# Patient Record
Sex: FEMALE | Race: WHITE | Employment: OTHER | URBAN - METROPOLITAN AREA
[De-identification: names, ages, dates, MRNs, and addresses within clinical notes are randomized per-mention and may not be internally consistent; named-entity substitution may affect disease eponyms.]

---

## 2017-01-01 ENCOUNTER — TELEPHONE (OUTPATIENT)
Dept: INTERNAL MEDICINE CLINIC | Age: 82
End: 2017-01-01

## 2017-01-01 ENCOUNTER — HOSPITAL ENCOUNTER (INPATIENT)
Age: 82
LOS: 6 days | Discharge: NURSING, REHAB OR LONG-TERM CARE | DRG: 682 | End: 2017-03-03
Attending: EMERGENCY MEDICINE | Admitting: INTERNAL MEDICINE
Payer: MEDICARE

## 2017-01-01 ENCOUNTER — APPOINTMENT (OUTPATIENT)
Dept: CT IMAGING | Age: 82
DRG: 682 | End: 2017-01-01
Attending: NURSE PRACTITIONER
Payer: MEDICARE

## 2017-01-01 ENCOUNTER — APPOINTMENT (OUTPATIENT)
Dept: MRI IMAGING | Age: 82
DRG: 682 | End: 2017-01-01
Attending: INTERNAL MEDICINE
Payer: MEDICARE

## 2017-01-01 ENCOUNTER — APPOINTMENT (OUTPATIENT)
Dept: GENERAL RADIOLOGY | Age: 82
DRG: 682 | End: 2017-01-01
Attending: NURSE PRACTITIONER
Payer: MEDICARE

## 2017-01-01 ENCOUNTER — PATIENT OUTREACH (OUTPATIENT)
Dept: INTERNAL MEDICINE CLINIC | Age: 82
End: 2017-01-01

## 2017-01-01 VITALS
BODY MASS INDEX: 21.66 KG/M2 | DIASTOLIC BLOOD PRESSURE: 81 MMHG | TEMPERATURE: 98.9 F | SYSTOLIC BLOOD PRESSURE: 162 MMHG | OXYGEN SATURATION: 94 % | RESPIRATION RATE: 16 BRPM | HEART RATE: 90 BPM | HEIGHT: 65 IN | WEIGHT: 130 LBS

## 2017-01-01 DIAGNOSIS — Z71.89 GOALS OF CARE, COUNSELING/DISCUSSION: ICD-10-CM

## 2017-01-01 DIAGNOSIS — E86.0 DEHYDRATION: ICD-10-CM

## 2017-01-01 DIAGNOSIS — E88.09 HYPOALBUMINEMIA: ICD-10-CM

## 2017-01-01 DIAGNOSIS — N17.9 AKI (ACUTE KIDNEY INJURY) (HCC): Primary | ICD-10-CM

## 2017-01-01 DIAGNOSIS — R41.0 CONFUSION: ICD-10-CM
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
## 2017-01-01 DIAGNOSIS — F01.50 VASCULAR DEMENTIA WITHOUT BEHAVIORAL DISTURBANCE (HCC): ICD-10-CM

## 2017-01-01 DIAGNOSIS — R53.81 DEBILITY: ICD-10-CM

## 2017-01-01 DIAGNOSIS — R05.9 COUGH: ICD-10-CM

## 2017-01-01 LAB
ALBUMIN SERPL BCP-MCNC: 2.3 G/DL (ref 3.5–5)
ALBUMIN SERPL BCP-MCNC: 3.2 G/DL (ref 3.5–5)
ALBUMIN/GLOB SERPL: 0.6 {RATIO} (ref 1.1–2.2)
ALBUMIN/GLOB SERPL: 0.7 {RATIO} (ref 1.1–2.2)
ALP SERPL-CCNC: 43 U/L (ref 45–117)
ALP SERPL-CCNC: 62 U/L (ref 45–117)
ALT SERPL-CCNC: 16 U/L (ref 12–78)
ALT SERPL-CCNC: 22 U/L (ref 12–78)
AMMONIA PLAS-SCNC: <10 UMOL/L
AMORPH CRY URNS QL MICRO: ABNORMAL
ANION GAP BLD CALC-SCNC: 10 MMOL/L (ref 5–15)
ANION GAP BLD CALC-SCNC: 14 MMOL/L (ref 5–15)
ANION GAP BLD CALC-SCNC: 17 MMOL/L (ref 5–15)
ANION GAP BLD CALC-SCNC: 19 MMOL/L (ref 5–15)
APPEARANCE UR: ABNORMAL
APTT PPP: 26.3 SEC (ref 22.1–32.5)
AST SERPL W P-5'-P-CCNC: 37 U/L (ref 15–37)
AST SERPL W P-5'-P-CCNC: 46 U/L (ref 15–37)
ATRIAL RATE: 112 BPM
BACTERIA SPEC CULT: NORMAL
BACTERIA URNS QL MICRO: NEGATIVE /HPF
BASOPHILS # BLD AUTO: 0 K/UL (ref 0–0.1)
BASOPHILS # BLD AUTO: 0 K/UL (ref 0–0.1)
BASOPHILS # BLD: 0 % (ref 0–1)
BASOPHILS # BLD: 0 % (ref 0–1)
BILIRUB DIRECT SERPL-MCNC: 0.3 MG/DL (ref 0–0.2)
BILIRUB SERPL-MCNC: 1 MG/DL (ref 0.2–1)
BILIRUB SERPL-MCNC: 1.7 MG/DL (ref 0.2–1)
BILIRUB UR QL CFM: POSITIVE
BUN SERPL-MCNC: 28 MG/DL (ref 6–20)
BUN SERPL-MCNC: 43 MG/DL (ref 6–20)
BUN SERPL-MCNC: 52 MG/DL (ref 6–20)
BUN SERPL-MCNC: 70 MG/DL (ref 6–20)
BUN/CREAT SERPL: 17 (ref 12–20)
BUN/CREAT SERPL: 26 (ref 12–20)
BUN/CREAT SERPL: 32 (ref 12–20)
BUN/CREAT SERPL: 35 (ref 12–20)
CALCIUM SERPL-MCNC: 7.9 MG/DL (ref 8.5–10.1)
CALCIUM SERPL-MCNC: 8.1 MG/DL (ref 8.5–10.1)
CALCIUM SERPL-MCNC: 8.4 MG/DL (ref 8.5–10.1)
CALCIUM SERPL-MCNC: 9.2 MG/DL (ref 8.5–10.1)
CALCULATED P AXIS, ECG09: -7 DEGREES
CALCULATED R AXIS, ECG10: 34 DEGREES
CALCULATED T AXIS, ECG11: 35 DEGREES
CHLORIDE SERPL-SCNC: 100 MMOL/L (ref 97–108)
CHLORIDE SERPL-SCNC: 109 MMOL/L (ref 97–108)
CHLORIDE SERPL-SCNC: 112 MMOL/L (ref 97–108)
CHLORIDE SERPL-SCNC: 112 MMOL/L (ref 97–108)
CK SERPL-CCNC: 333 U/L (ref 26–192)
CO2 SERPL-SCNC: 17 MMOL/L (ref 21–32)
CO2 SERPL-SCNC: 18 MMOL/L (ref 21–32)
CO2 SERPL-SCNC: 20 MMOL/L (ref 21–32)
CO2 SERPL-SCNC: 20 MMOL/L (ref 21–32)
COLOR UR: ABNORMAL
CREAT SERPL-MCNC: 1.36 MG/DL (ref 0.55–1.02)
CREAT SERPL-MCNC: 1.61 MG/DL (ref 0.55–1.02)
CREAT SERPL-MCNC: 1.63 MG/DL (ref 0.55–1.02)
CREAT SERPL-MCNC: 1.67 MG/DL (ref 0.55–1.02)
CREAT SERPL-MCNC: 2.01 MG/DL (ref 0.55–1.02)
DIAGNOSIS, 93000: NORMAL
DIFFERENTIAL METHOD BLD: ABNORMAL
EOSINOPHIL # BLD: 0 K/UL (ref 0–0.4)
EOSINOPHIL # BLD: 0 K/UL (ref 0–0.4)
EOSINOPHIL NFR BLD: 0 % (ref 0–7)
EOSINOPHIL NFR BLD: 0 % (ref 0–7)
EPITH CASTS URNS QL MICRO: ABNORMAL /LPF
ERYTHROCYTE [DISTWIDTH] IN BLOOD BY AUTOMATED COUNT: 15.9 % (ref 11.5–14.5)
ERYTHROCYTE [DISTWIDTH] IN BLOOD BY AUTOMATED COUNT: 16.2 % (ref 11.5–14.5)
FOLATE SERPL-MCNC: 21.2 NG/ML (ref 5–21)
GLOBULIN SER CALC-MCNC: 4 G/DL (ref 2–4)
GLOBULIN SER CALC-MCNC: 4.6 G/DL (ref 2–4)
GLUCOSE SERPL-MCNC: 158 MG/DL (ref 65–100)
GLUCOSE SERPL-MCNC: 177 MG/DL (ref 65–100)
GLUCOSE SERPL-MCNC: 191 MG/DL (ref 65–100)
GLUCOSE SERPL-MCNC: 220 MG/DL (ref 65–100)
GLUCOSE UR STRIP.AUTO-MCNC: NEGATIVE MG/DL
GRAN CASTS URNS QL MICRO: ABNORMAL /LPF
HCT VFR BLD AUTO: 38.3 % (ref 35–47)
HCT VFR BLD AUTO: 46 % (ref 35–47)
HGB BLD-MCNC: 12.7 G/DL (ref 11.5–16)
HGB BLD-MCNC: 15.3 G/DL (ref 11.5–16)
HGB UR QL STRIP: NEGATIVE
INR PPP: 1.1 (ref 0.9–1.1)
INR PPP: 1.2 (ref 0.9–1.1)
INR PPP: 1.4 (ref 0.9–1.1)
KETONES UR QL STRIP.AUTO: NEGATIVE MG/DL
LACTATE SERPL-SCNC: 0.9 MMOL/L (ref 0.4–2)
LACTATE SERPL-SCNC: 1 MMOL/L (ref 0.4–2)
LACTATE SERPL-SCNC: 3.1 MMOL/L (ref 0.4–2)
LEUKOCYTE ESTERASE UR QL STRIP.AUTO: NEGATIVE
LIPASE SERPL-CCNC: 182 U/L (ref 73–393)
LYMPHOCYTES # BLD AUTO: 4 % (ref 12–49)
LYMPHOCYTES # BLD AUTO: 7 % (ref 12–49)
LYMPHOCYTES # BLD: 0.8 K/UL (ref 0.8–3.5)
LYMPHOCYTES # BLD: 0.8 K/UL (ref 0.8–3.5)
MAGNESIUM SERPL-MCNC: 2.6 MG/DL (ref 1.6–2.4)
MCH RBC QN AUTO: 30.7 PG (ref 26–34)
MCH RBC QN AUTO: 31 PG (ref 26–34)
MCHC RBC AUTO-ENTMCNC: 33.2 G/DL (ref 30–36.5)
MCHC RBC AUTO-ENTMCNC: 33.3 G/DL (ref 30–36.5)
MCV RBC AUTO: 92.4 FL (ref 80–99)
MCV RBC AUTO: 93.4 FL (ref 80–99)
MONOCYTES # BLD: 0.6 K/UL (ref 0–1)
MONOCYTES # BLD: 1.2 K/UL (ref 0–1)
MONOCYTES NFR BLD AUTO: 6 % (ref 5–13)
MONOCYTES NFR BLD AUTO: 6 % (ref 5–13)
NEUTS SEG # BLD: 17.9 K/UL (ref 1.8–8)
NEUTS SEG # BLD: 9.4 K/UL (ref 1.8–8)
NEUTS SEG NFR BLD AUTO: 87 % (ref 32–75)
NEUTS SEG NFR BLD AUTO: 90 % (ref 32–75)
NITRITE UR QL STRIP.AUTO: NEGATIVE
NRBC # BLD: 0 K/UL (ref 0–0.01)
NRBC BLD-RTO: 0 PER 100 WBC
P-R INTERVAL, ECG05: 126 MS
PH UR STRIP: 5 [PH] (ref 5–8)
PLATELET # BLD AUTO: 144 K/UL (ref 150–400)
PLATELET # BLD AUTO: 198 K/UL (ref 150–400)
POTASSIUM SERPL-SCNC: 3.7 MMOL/L (ref 3.5–5.1)
POTASSIUM SERPL-SCNC: 4 MMOL/L (ref 3.5–5.1)
POTASSIUM SERPL-SCNC: 4.4 MMOL/L (ref 3.5–5.1)
POTASSIUM SERPL-SCNC: 4.8 MMOL/L (ref 3.5–5.1)
PROT SERPL-MCNC: 6.3 G/DL (ref 6.4–8.2)
PROT SERPL-MCNC: 7.8 G/DL (ref 6.4–8.2)
PROT UR STRIP-MCNC: 30 MG/DL
PROTHROMBIN TIME: 10.9 SEC (ref 9–11.1)
PROTHROMBIN TIME: 11.8 SEC (ref 9–11.1)
PROTHROMBIN TIME: 14.5 SEC (ref 9–11.1)
Q-T INTERVAL, ECG07: 340 MS
QRS DURATION, ECG06: 70 MS
QTC CALCULATION (BEZET), ECG08: 464 MS
RBC # BLD AUTO: 4.1 M/UL (ref 3.8–5.2)
RBC # BLD AUTO: 4.98 M/UL (ref 3.8–5.2)
RBC #/AREA URNS HPF: ABNORMAL /HPF (ref 0–5)
RBC MORPH BLD: ABNORMAL
RBC MORPH BLD: ABNORMAL
SERVICE CMNT-IMP: NORMAL
SODIUM SERPL-SCNC: 139 MMOL/L (ref 136–145)
SODIUM SERPL-SCNC: 142 MMOL/L (ref 136–145)
SODIUM SERPL-SCNC: 143 MMOL/L (ref 136–145)
SODIUM SERPL-SCNC: 144 MMOL/L (ref 136–145)
SP GR UR REFRACTOMETRY: 1.02 (ref 1–1.03)
THERAPEUTIC RANGE,PTTT: NORMAL SECS (ref 58–77)
TROPONIN I SERPL-MCNC: 0.08 NG/ML
TROPONIN I SERPL-MCNC: 0.08 NG/ML
TROPONIN I SERPL-MCNC: 0.12 NG/ML
TSH SERPL DL<=0.05 MIU/L-ACNC: 0.49 UIU/ML (ref 0.36–3.74)
UROBILINOGEN UR QL STRIP.AUTO: 0.2 EU/DL (ref 0.2–1)
VENTRICULAR RATE, ECG03: 112 BPM
VIT B12 SERPL-MCNC: >2000 PG/ML (ref 211–911)
WBC # BLD AUTO: 10.8 K/UL (ref 3.6–11)
WBC # BLD AUTO: 19.9 K/UL (ref 3.6–11)
WBC URNS QL MICRO: ABNORMAL /HPF (ref 0–4)

## 2017-01-01 PROCEDURE — 83605 ASSAY OF LACTIC ACID: CPT | Performed by: NURSE PRACTITIONER

## 2017-01-01 PROCEDURE — 74011000258 HC RX REV CODE- 258: Performed by: INTERNAL MEDICINE

## 2017-01-01 PROCEDURE — 77010033678 HC OXYGEN DAILY

## 2017-01-01 PROCEDURE — 36415 COLL VENOUS BLD VENIPUNCTURE: CPT | Performed by: INTERNAL MEDICINE

## 2017-01-01 PROCEDURE — 76450000000

## 2017-01-01 PROCEDURE — 65660000000 HC RM CCU STEPDOWN

## 2017-01-01 PROCEDURE — 85610 PROTHROMBIN TIME: CPT | Performed by: INTERNAL MEDICINE

## 2017-01-01 PROCEDURE — 97530 THERAPEUTIC ACTIVITIES: CPT

## 2017-01-01 PROCEDURE — 97110 THERAPEUTIC EXERCISES: CPT

## 2017-01-01 PROCEDURE — 74011250636 HC RX REV CODE- 250/636: Performed by: INTERNAL MEDICINE

## 2017-01-01 PROCEDURE — 77030011943

## 2017-01-01 PROCEDURE — 81001 URINALYSIS AUTO W/SCOPE: CPT | Performed by: NURSE PRACTITIONER

## 2017-01-01 PROCEDURE — 74011636637 HC RX REV CODE- 636/637: Performed by: INTERNAL MEDICINE

## 2017-01-01 PROCEDURE — 80048 BASIC METABOLIC PNL TOTAL CA: CPT | Performed by: INTERNAL MEDICINE

## 2017-01-01 PROCEDURE — 82140 ASSAY OF AMMONIA: CPT | Performed by: NURSE PRACTITIONER

## 2017-01-01 PROCEDURE — 97116 GAIT TRAINING THERAPY: CPT

## 2017-01-01 PROCEDURE — 72170 X-RAY EXAM OF PELVIS: CPT

## 2017-01-01 PROCEDURE — 84484 ASSAY OF TROPONIN QUANT: CPT | Performed by: INTERNAL MEDICINE

## 2017-01-01 PROCEDURE — 82565 ASSAY OF CREATININE: CPT | Performed by: INTERNAL MEDICINE

## 2017-01-01 PROCEDURE — 97535 SELF CARE MNGMENT TRAINING: CPT | Performed by: OCCUPATIONAL THERAPIST

## 2017-01-01 PROCEDURE — 74011250637 HC RX REV CODE- 250/637: Performed by: INTERNAL MEDICINE

## 2017-01-01 PROCEDURE — 72100 X-RAY EXAM L-S SPINE 2/3 VWS: CPT

## 2017-01-01 PROCEDURE — 74176 CT ABD & PELVIS W/O CONTRAST: CPT

## 2017-01-01 PROCEDURE — 70450 CT HEAD/BRAIN W/O DYE: CPT

## 2017-01-01 PROCEDURE — 93005 ELECTROCARDIOGRAM TRACING: CPT

## 2017-01-01 PROCEDURE — 82607 VITAMIN B-12: CPT | Performed by: INTERNAL MEDICINE

## 2017-01-01 PROCEDURE — 70551 MRI BRAIN STEM W/O DYE: CPT

## 2017-01-01 PROCEDURE — 74011250636 HC RX REV CODE- 250/636: Performed by: NURSE PRACTITIONER

## 2017-01-01 PROCEDURE — 83605 ASSAY OF LACTIC ACID: CPT | Performed by: INTERNAL MEDICINE

## 2017-01-01 PROCEDURE — 97535 SELF CARE MNGMENT TRAINING: CPT

## 2017-01-01 PROCEDURE — 80076 HEPATIC FUNCTION PANEL: CPT | Performed by: INTERNAL MEDICINE

## 2017-01-01 PROCEDURE — 84484 ASSAY OF TROPONIN QUANT: CPT | Performed by: NURSE PRACTITIONER

## 2017-01-01 PROCEDURE — 51701 INSERT BLADDER CATHETER: CPT

## 2017-01-01 PROCEDURE — 72125 CT NECK SPINE W/O DYE: CPT

## 2017-01-01 PROCEDURE — 82746 ASSAY OF FOLIC ACID SERUM: CPT | Performed by: INTERNAL MEDICINE

## 2017-01-01 PROCEDURE — 84443 ASSAY THYROID STIM HORMONE: CPT | Performed by: INTERNAL MEDICINE

## 2017-01-01 PROCEDURE — 87040 BLOOD CULTURE FOR BACTERIA: CPT | Performed by: NURSE PRACTITIONER

## 2017-01-01 PROCEDURE — 85610 PROTHROMBIN TIME: CPT | Performed by: NURSE PRACTITIONER

## 2017-01-01 PROCEDURE — 85025 COMPLETE CBC W/AUTO DIFF WBC: CPT | Performed by: NURSE PRACTITIONER

## 2017-01-01 PROCEDURE — 71020 XR CHEST PA LAT: CPT

## 2017-01-01 PROCEDURE — 93306 TTE W/DOPPLER COMPLETE: CPT

## 2017-01-01 PROCEDURE — 99285 EMERGENCY DEPT VISIT HI MDM: CPT

## 2017-01-01 PROCEDURE — 83735 ASSAY OF MAGNESIUM: CPT | Performed by: NURSE PRACTITIONER

## 2017-01-01 PROCEDURE — 85730 THROMBOPLASTIN TIME PARTIAL: CPT | Performed by: NURSE PRACTITIONER

## 2017-01-01 PROCEDURE — 83690 ASSAY OF LIPASE: CPT | Performed by: NURSE PRACTITIONER

## 2017-01-01 PROCEDURE — 97161 PT EVAL LOW COMPLEX 20 MIN: CPT

## 2017-01-01 PROCEDURE — 82550 ASSAY OF CK (CPK): CPT | Performed by: NURSE PRACTITIONER

## 2017-01-01 PROCEDURE — 97165 OT EVAL LOW COMPLEX 30 MIN: CPT

## 2017-01-01 PROCEDURE — 72070 X-RAY EXAM THORAC SPINE 2VWS: CPT

## 2017-01-01 PROCEDURE — 77030032490 HC SLV COMPR SCD KNE COVD -B

## 2017-01-01 PROCEDURE — 36415 COLL VENOUS BLD VENIPUNCTURE: CPT | Performed by: NURSE PRACTITIONER

## 2017-01-01 PROCEDURE — 80053 COMPREHEN METABOLIC PANEL: CPT | Performed by: NURSE PRACTITIONER

## 2017-01-01 PROCEDURE — 85025 COMPLETE CBC W/AUTO DIFF WBC: CPT | Performed by: INTERNAL MEDICINE

## 2017-01-01 RX ORDER — DONEPEZIL HYDROCHLORIDE 5 MG/1
5 TABLET, FILM COATED ORAL
Status: DISCONTINUED | OUTPATIENT
Start: 2017-01-01 | End: 2017-01-01 | Stop reason: HOSPADM

## 2017-01-01 RX ORDER — FERROUS GLUCONATE 324(38)MG
1 TABLET ORAL
Status: DISCONTINUED | OUTPATIENT
Start: 2017-01-01 | End: 2017-01-01 | Stop reason: HOSPADM

## 2017-01-01 RX ORDER — MEMANTINE HYDROCHLORIDE 10 MG/1
5 TABLET ORAL 2 TIMES DAILY
Qty: 180 TAB | Refills: 1 | Status: SHIPPED | OUTPATIENT
Start: 2017-01-01

## 2017-01-01 RX ORDER — MELATONIN
1000 DAILY
Status: DISCONTINUED | OUTPATIENT
Start: 2017-01-01 | End: 2017-01-01 | Stop reason: HOSPADM

## 2017-01-01 RX ORDER — WARFARIN SODIUM 5 MG/1
5 TABLET ORAL ONCE
Status: COMPLETED | OUTPATIENT
Start: 2017-01-01 | End: 2017-01-01

## 2017-01-01 RX ORDER — MEMANTINE HYDROCHLORIDE 10 MG/1
5 TABLET ORAL 2 TIMES DAILY
Status: DISCONTINUED | OUTPATIENT
Start: 2017-01-01 | End: 2017-01-01 | Stop reason: HOSPADM

## 2017-01-01 RX ORDER — GUAIFENESIN/DEXTROMETHORPHAN 100-10MG/5
5 SYRUP ORAL
Status: DISCONTINUED | OUTPATIENT
Start: 2017-01-01 | End: 2017-01-01 | Stop reason: HOSPADM

## 2017-01-01 RX ORDER — MEMANTINE HYDROCHLORIDE 10 MG/1
10 TABLET ORAL 2 TIMES DAILY
Status: DISCONTINUED | OUTPATIENT
Start: 2017-01-01 | End: 2017-01-01

## 2017-01-01 RX ORDER — SODIUM CHLORIDE 9 MG/ML
100 INJECTION, SOLUTION INTRAVENOUS CONTINUOUS
Status: DISCONTINUED | OUTPATIENT
Start: 2017-01-01 | End: 2017-01-01

## 2017-01-01 RX ORDER — ACETAMINOPHEN 325 MG/1
650 TABLET ORAL
Status: DISCONTINUED | OUTPATIENT
Start: 2017-01-01 | End: 2017-01-01 | Stop reason: HOSPADM

## 2017-01-01 RX ORDER — LANOLIN ALCOHOL/MO/W.PET/CERES
1000 CREAM (GRAM) TOPICAL DAILY
COMMUNITY

## 2017-01-01 RX ORDER — SODIUM CHLORIDE 0.9 % (FLUSH) 0.9 %
5-10 SYRINGE (ML) INJECTION AS NEEDED
Status: DISCONTINUED | OUTPATIENT
Start: 2017-01-01 | End: 2017-01-01 | Stop reason: HOSPADM

## 2017-01-01 RX ORDER — LOPERAMIDE HYDROCHLORIDE 2 MG/1
2 CAPSULE ORAL
Status: DISCONTINUED | OUTPATIENT
Start: 2017-01-01 | End: 2017-01-01 | Stop reason: HOSPADM

## 2017-01-01 RX ORDER — PREDNISONE 5 MG/1
10 TABLET ORAL EVERY EVENING
Status: DISCONTINUED | OUTPATIENT
Start: 2017-01-01 | End: 2017-01-01 | Stop reason: HOSPADM

## 2017-01-01 RX ORDER — SODIUM CHLORIDE 0.9 % (FLUSH) 0.9 %
5-10 SYRINGE (ML) INJECTION EVERY 8 HOURS
Status: DISCONTINUED | OUTPATIENT
Start: 2017-01-01 | End: 2017-01-01 | Stop reason: HOSPADM

## 2017-01-01 RX ORDER — ONDANSETRON 2 MG/ML
4 INJECTION INTRAMUSCULAR; INTRAVENOUS
Status: DISCONTINUED | OUTPATIENT
Start: 2017-01-01 | End: 2017-01-01 | Stop reason: HOSPADM

## 2017-01-01 RX ORDER — FERROUS SULFATE, DRIED 160(50) MG
1 TABLET, EXTENDED RELEASE ORAL 2 TIMES DAILY WITH MEALS
Status: DISCONTINUED | OUTPATIENT
Start: 2017-01-01 | End: 2017-01-01 | Stop reason: HOSPADM

## 2017-01-01 RX ORDER — HYDRALAZINE HYDROCHLORIDE 20 MG/ML
10 INJECTION INTRAMUSCULAR; INTRAVENOUS
Status: DISCONTINUED | OUTPATIENT
Start: 2017-01-01 | End: 2017-01-01 | Stop reason: HOSPADM

## 2017-01-01 RX ORDER — LANOLIN ALCOHOL/MO/W.PET/CERES
1000 CREAM (GRAM) TOPICAL DAILY
Status: DISCONTINUED | OUTPATIENT
Start: 2017-01-01 | End: 2017-01-01

## 2017-01-01 RX ORDER — AMOXICILLIN AND CLAVULANATE POTASSIUM 500; 125 MG/1; MG/1
1 TABLET, FILM COATED ORAL EVERY 12 HOURS
Status: DISCONTINUED | OUTPATIENT
Start: 2017-01-01 | End: 2017-01-01 | Stop reason: HOSPADM

## 2017-01-01 RX ORDER — AMOXICILLIN AND CLAVULANATE POTASSIUM 500; 125 MG/1; MG/1
1 TABLET, FILM COATED ORAL EVERY 12 HOURS
Qty: 10 TAB | Refills: 0 | Status: SHIPPED | OUTPATIENT
Start: 2017-01-01

## 2017-01-01 RX ORDER — FERROUS GLUCONATE 324(38)MG
TABLET ORAL
Qty: 30 TAB | Refills: 2 | OUTPATIENT
Start: 2017-01-01

## 2017-01-01 RX ORDER — PANTOPRAZOLE SODIUM 40 MG/1
40 TABLET, DELAYED RELEASE ORAL DAILY
Status: DISCONTINUED | OUTPATIENT
Start: 2017-01-01 | End: 2017-01-01 | Stop reason: HOSPADM

## 2017-01-01 RX ORDER — WARFARIN SODIUM 5 MG/1
5 TABLET ORAL DAILY
Qty: 30 TAB | Refills: 0 | Status: SHIPPED
Start: 2017-01-01

## 2017-01-01 RX ADMIN — Medication 10 ML: at 07:42

## 2017-01-01 RX ADMIN — PIPERACILLIN SODIUM,TAZOBACTAM SODIUM 3.38 G: 3; .375 INJECTION, POWDER, FOR SOLUTION INTRAVENOUS at 06:43

## 2017-01-01 RX ADMIN — HYDRALAZINE HYDROCHLORIDE 10 MG: 20 INJECTION INTRAMUSCULAR; INTRAVENOUS at 02:51

## 2017-01-01 RX ADMIN — VITAMIN D, TAB 1000IU (100/BT) 1000 UNITS: 25 TAB at 08:31

## 2017-01-01 RX ADMIN — PIPERACILLIN SODIUM,TAZOBACTAM SODIUM 3.38 G: 3; .375 INJECTION, POWDER, FOR SOLUTION INTRAVENOUS at 23:10

## 2017-01-01 RX ADMIN — FERROUS GLUCONATE 1 TABLET: 324 TABLET ORAL at 09:46

## 2017-01-01 RX ADMIN — Medication 10 ML: at 21:37

## 2017-01-01 RX ADMIN — FERROUS GLUCONATE 1 TABLET: 324 TABLET ORAL at 09:28

## 2017-01-01 RX ADMIN — CALCIUM CARBONATE 500 MG (1,250 MG)-VITAMIN D3 200 UNIT TABLET 1 TABLET: at 09:01

## 2017-01-01 RX ADMIN — MEMANTINE HYDROCHLORIDE 10 MG: 10 TABLET, FILM COATED ORAL at 18:17

## 2017-01-01 RX ADMIN — MEMANTINE HYDROCHLORIDE 10 MG: 10 TABLET, FILM COATED ORAL at 17:55

## 2017-01-01 RX ADMIN — MEMANTINE HYDROCHLORIDE 5 MG: 10 TABLET ORAL at 21:21

## 2017-01-01 RX ADMIN — CALCIUM CARBONATE 500 MG (1,250 MG)-VITAMIN D3 200 UNIT TABLET 1 TABLET: at 16:25

## 2017-01-01 RX ADMIN — PANTOPRAZOLE SODIUM 40 MG: 40 TABLET, DELAYED RELEASE ORAL at 06:24

## 2017-01-01 RX ADMIN — SODIUM CHLORIDE 100 ML/HR: 900 INJECTION, SOLUTION INTRAVENOUS at 13:51

## 2017-01-01 RX ADMIN — CALCIUM CARBONATE 500 MG (1,250 MG)-VITAMIN D3 200 UNIT TABLET 1 TABLET: at 17:01

## 2017-01-01 RX ADMIN — Medication 1000 MCG: at 09:45

## 2017-01-01 RX ADMIN — FERROUS GLUCONATE 1 TABLET: 324 TABLET ORAL at 09:00

## 2017-01-01 RX ADMIN — PREDNISONE 10 MG: 5 TABLET ORAL at 17:55

## 2017-01-01 RX ADMIN — MEMANTINE HYDROCHLORIDE 10 MG: 10 TABLET, FILM COATED ORAL at 09:28

## 2017-01-01 RX ADMIN — Medication 10 ML: at 22:00

## 2017-01-01 RX ADMIN — LOPERAMIDE HYDROCHLORIDE 2 MG: 2 CAPSULE ORAL at 19:10

## 2017-01-01 RX ADMIN — RIVAROXABAN 20 MG: 20 TABLET, FILM COATED ORAL at 16:25

## 2017-01-01 RX ADMIN — CALCIUM CARBONATE 500 MG (1,250 MG)-VITAMIN D3 200 UNIT TABLET 1 TABLET: at 18:17

## 2017-01-01 RX ADMIN — PIPERACILLIN SODIUM,TAZOBACTAM SODIUM 3.38 G: 3; .375 INJECTION, POWDER, FOR SOLUTION INTRAVENOUS at 06:42

## 2017-01-01 RX ADMIN — DONEPEZIL HYDROCHLORIDE 5 MG: 5 TABLET, FILM COATED ORAL at 22:48

## 2017-01-01 RX ADMIN — VITAMIN D, TAB 1000IU (100/BT) 1000 UNITS: 25 TAB at 09:00

## 2017-01-01 RX ADMIN — MEMANTINE HYDROCHLORIDE 10 MG: 10 TABLET, FILM COATED ORAL at 17:31

## 2017-01-01 RX ADMIN — GUAIFENESIN AND DEXTROMETHORPHAN 5 ML: 100; 10 SYRUP ORAL at 06:34

## 2017-01-01 RX ADMIN — PIPERACILLIN SODIUM,TAZOBACTAM SODIUM 3.38 G: 3; .375 INJECTION, POWDER, FOR SOLUTION INTRAVENOUS at 14:51

## 2017-01-01 RX ADMIN — Medication 10 ML: at 21:21

## 2017-01-01 RX ADMIN — Medication 10 ML: at 06:42

## 2017-01-01 RX ADMIN — RIVAROXABAN 20 MG: 20 TABLET, FILM COATED ORAL at 18:17

## 2017-01-01 RX ADMIN — ACETAMINOPHEN 650 MG: 325 TABLET ORAL at 17:16

## 2017-01-01 RX ADMIN — FERROUS GLUCONATE 1 TABLET: 324 TABLET ORAL at 08:31

## 2017-01-01 RX ADMIN — AMOXICILLIN AND CLAVULANATE POTASSIUM 1 TABLET: 500; 125 TABLET, FILM COATED ORAL at 22:07

## 2017-01-01 RX ADMIN — WARFARIN SODIUM 5 MG: 5 TABLET ORAL at 17:16

## 2017-01-01 RX ADMIN — CALCIUM CARBONATE 500 MG (1,250 MG)-VITAMIN D3 200 UNIT TABLET 1 TABLET: at 08:31

## 2017-01-01 RX ADMIN — DONEPEZIL HYDROCHLORIDE 5 MG: 5 TABLET, FILM COATED ORAL at 21:36

## 2017-01-01 RX ADMIN — PREDNISONE 10 MG: 5 TABLET ORAL at 17:31

## 2017-01-01 RX ADMIN — GUAIFENESIN AND DEXTROMETHORPHAN 5 ML: 100; 10 SYRUP ORAL at 21:42

## 2017-01-01 RX ADMIN — Medication 10 ML: at 08:09

## 2017-01-01 RX ADMIN — Medication 10 ML: at 15:20

## 2017-01-01 RX ADMIN — DONEPEZIL HYDROCHLORIDE 5 MG: 5 TABLET, FILM COATED ORAL at 22:08

## 2017-01-01 RX ADMIN — PREDNISONE 10 MG: 5 TABLET ORAL at 17:16

## 2017-01-01 RX ADMIN — RIVAROXABAN 20 MG: 20 TABLET, FILM COATED ORAL at 17:31

## 2017-01-01 RX ADMIN — CALCIUM CARBONATE 500 MG (1,250 MG)-VITAMIN D3 200 UNIT TABLET 1 TABLET: at 17:16

## 2017-01-01 RX ADMIN — MEMANTINE HYDROCHLORIDE 5 MG: 10 TABLET ORAL at 09:28

## 2017-01-01 RX ADMIN — VITAMIN D, TAB 1000IU (100/BT) 1000 UNITS: 25 TAB at 09:28

## 2017-01-01 RX ADMIN — PREDNISONE 10 MG: 5 TABLET ORAL at 22:56

## 2017-01-01 RX ADMIN — SODIUM CHLORIDE 1000 ML: 900 INJECTION, SOLUTION INTRAVENOUS at 19:38

## 2017-01-01 RX ADMIN — WARFARIN SODIUM 5 MG: 5 TABLET ORAL at 17:21

## 2017-01-01 RX ADMIN — MEMANTINE HYDROCHLORIDE 10 MG: 10 TABLET, FILM COATED ORAL at 09:00

## 2017-01-01 RX ADMIN — CALCIUM CARBONATE 500 MG (1,250 MG)-VITAMIN D3 200 UNIT TABLET 1 TABLET: at 09:28

## 2017-01-01 RX ADMIN — PREDNISONE 10 MG: 5 TABLET ORAL at 17:21

## 2017-01-01 RX ADMIN — Medication 10 ML: at 17:31

## 2017-01-01 RX ADMIN — MEMANTINE HYDROCHLORIDE 10 MG: 10 TABLET, FILM COATED ORAL at 08:31

## 2017-01-01 RX ADMIN — MEMANTINE HYDROCHLORIDE 5 MG: 10 TABLET ORAL at 09:01

## 2017-01-01 RX ADMIN — CALCIUM CARBONATE 500 MG (1,250 MG)-VITAMIN D3 200 UNIT TABLET 1 TABLET: at 09:44

## 2017-01-01 RX ADMIN — MEMANTINE HYDROCHLORIDE 5 MG: 10 TABLET ORAL at 22:07

## 2017-01-01 RX ADMIN — PIPERACILLIN SODIUM,TAZOBACTAM SODIUM 3.38 G: 3; .375 INJECTION, POWDER, FOR SOLUTION INTRAVENOUS at 22:39

## 2017-01-01 RX ADMIN — PIPERACILLIN SODIUM,TAZOBACTAM SODIUM 3.38 G: 3; .375 INJECTION, POWDER, FOR SOLUTION INTRAVENOUS at 08:20

## 2017-01-01 RX ADMIN — PREDNISONE 10 MG: 5 TABLET ORAL at 18:17

## 2017-01-01 RX ADMIN — PANTOPRAZOLE SODIUM 40 MG: 40 TABLET, DELAYED RELEASE ORAL at 08:31

## 2017-01-01 RX ADMIN — PIPERACILLIN SODIUM,TAZOBACTAM SODIUM 3.38 G: 3; .375 INJECTION, POWDER, FOR SOLUTION INTRAVENOUS at 22:49

## 2017-01-01 RX ADMIN — Medication 10 ML: at 06:24

## 2017-01-01 RX ADMIN — CALCIUM CARBONATE 500 MG (1,250 MG)-VITAMIN D3 200 UNIT TABLET 1 TABLET: at 09:00

## 2017-01-01 RX ADMIN — CALCIUM CARBONATE 500 MG (1,250 MG)-VITAMIN D3 200 UNIT TABLET 1 TABLET: at 17:31

## 2017-01-01 RX ADMIN — VITAMIN D, TAB 1000IU (100/BT) 1000 UNITS: 25 TAB at 09:02

## 2017-01-01 RX ADMIN — AMOXICILLIN AND CLAVULANATE POTASSIUM 1 TABLET: 500; 125 TABLET, FILM COATED ORAL at 09:01

## 2017-01-01 RX ADMIN — PREDNISONE 10 MG: 5 TABLET ORAL at 17:01

## 2017-01-01 RX ADMIN — PANTOPRAZOLE SODIUM 40 MG: 40 TABLET, DELAYED RELEASE ORAL at 09:28

## 2017-01-01 RX ADMIN — PANTOPRAZOLE SODIUM 40 MG: 40 TABLET, DELAYED RELEASE ORAL at 07:42

## 2017-01-01 RX ADMIN — PIPERACILLIN SODIUM,TAZOBACTAM SODIUM 3.38 G: 3; .375 INJECTION, POWDER, FOR SOLUTION INTRAVENOUS at 15:38

## 2017-01-01 RX ADMIN — AMOXICILLIN AND CLAVULANATE POTASSIUM 1 TABLET: 500; 125 TABLET, FILM COATED ORAL at 21:21

## 2017-01-01 RX ADMIN — MEMANTINE HYDROCHLORIDE 10 MG: 10 TABLET, FILM COATED ORAL at 09:46

## 2017-01-01 RX ADMIN — PANTOPRAZOLE SODIUM 40 MG: 40 TABLET, DELAYED RELEASE ORAL at 08:08

## 2017-01-01 RX ADMIN — DONEPEZIL HYDROCHLORIDE 5 MG: 5 TABLET, FILM COATED ORAL at 22:56

## 2017-01-01 RX ADMIN — PIPERACILLIN SODIUM,TAZOBACTAM SODIUM 3.38 G: 3; .375 INJECTION, POWDER, FOR SOLUTION INTRAVENOUS at 14:57

## 2017-01-01 RX ADMIN — FERROUS GLUCONATE 1 TABLET: 324 TABLET ORAL at 09:01

## 2017-01-01 RX ADMIN — SODIUM CHLORIDE 100 ML/HR: 900 INJECTION, SOLUTION INTRAVENOUS at 02:27

## 2017-01-01 RX ADMIN — Medication 10 ML: at 14:32

## 2017-01-01 RX ADMIN — Medication 10 ML: at 21:38

## 2017-01-01 RX ADMIN — SODIUM CHLORIDE 75 ML/HR: 900 INJECTION, SOLUTION INTRAVENOUS at 15:32

## 2017-01-01 RX ADMIN — CALCIUM CARBONATE 500 MG (1,250 MG)-VITAMIN D3 200 UNIT TABLET 1 TABLET: at 16:43

## 2017-01-01 RX ADMIN — PIPERACILLIN SODIUM,TAZOBACTAM SODIUM 3.38 G: 3; .375 INJECTION, POWDER, FOR SOLUTION INTRAVENOUS at 16:24

## 2017-01-01 RX ADMIN — LOPERAMIDE HYDROCHLORIDE 2 MG: 2 CAPSULE ORAL at 09:02

## 2017-01-01 RX ADMIN — HYDRALAZINE HYDROCHLORIDE 10 MG: 20 INJECTION INTRAMUSCULAR; INTRAVENOUS at 18:39

## 2017-01-01 RX ADMIN — WARFARIN SODIUM 5 MG: 5 TABLET ORAL at 17:01

## 2017-01-01 RX ADMIN — Medication 10 ML: at 13:43

## 2017-01-01 RX ADMIN — Medication 10 ML: at 05:29

## 2017-01-01 RX ADMIN — SODIUM CHLORIDE 2000 ML: 900 INJECTION, SOLUTION INTRAVENOUS at 19:38

## 2017-01-01 RX ADMIN — PANTOPRAZOLE SODIUM 40 MG: 40 TABLET, DELAYED RELEASE ORAL at 06:43

## 2017-01-01 RX ADMIN — Medication 10 ML: at 22:50

## 2017-01-01 RX ADMIN — AMOXICILLIN AND CLAVULANATE POTASSIUM 1 TABLET: 500; 125 TABLET, FILM COATED ORAL at 09:28

## 2017-01-01 RX ADMIN — VITAMIN D, TAB 1000IU (100/BT) 1000 UNITS: 25 TAB at 09:45

## 2017-01-01 RX ADMIN — DONEPEZIL HYDROCHLORIDE 5 MG: 5 TABLET, FILM COATED ORAL at 21:38

## 2017-01-01 RX ADMIN — DONEPEZIL HYDROCHLORIDE 5 MG: 5 TABLET, FILM COATED ORAL at 21:21

## 2017-02-25 PROBLEM — E86.0 DEHYDRATION: Status: ACTIVE | Noted: 2017-01-01

## 2017-02-25 PROBLEM — E87.20 LACTIC ACIDOSIS: Status: ACTIVE | Noted: 2017-01-01

## 2017-02-25 PROBLEM — G93.41 ACUTE METABOLIC ENCEPHALOPATHY: Status: ACTIVE | Noted: 2017-01-01

## 2017-02-25 NOTE — IP AVS SNAPSHOT
Richmond Cain 
 
 
 [address] [place] [address] 
[phone] Patient: Eduard Restrepo MRN: [id number] MHT:51/81/9188 You are allergic to the following No active allergies Recent Documentation Height  
  
  
  
  
  
 1.651 m Emergency Contacts Name Discharge Info Relation Home Work Mobile Alex Brody DISCHARGE CAREGIVER [3] Child [2] [phone] About your hospitalization You were admitted on:  February 25, 2017 [facility] in the:  [facility]  MED SURG 2 You were discharged on:  March 3, 2017 Why you were hospitalized Your primary diagnosis was:  Not on File Your diagnoses also included:  Acute Metabolic Encephalopathy, Autoimmune Hepatitis (Hcc), Ckd (Chronic Kidney Disease) Stage 3, Gfr 30-59 Ml/Min, Cirrhosis Of Liver (Hcc), Dementia, Hx Pulmonary Embolism, Sirs (Systemic Inflammatory Response Syndrome) (Hcc), Dehydration, Lactic Acidosis Providers Seen During Your Hospitalizations Provider Role Specialty Primary office phone Gaston Anderson MD Attending Provider Emergency Medicine [phone] Steve Mason DO Attending Provider Internal Medicine [phone] Josee Estevez MD Attending Provider Internal Medicine [phone] Davidson Hope MD Attending Provider Hospitalist [phone] Your Primary Care Physician (PCP) Primary Care Physician Office Phone Office Fax [place], [address] [phone] Follow-up Information Follow up With Details Comments Contact Info Shailesh Schuler MD   [address] [facility] [address] 
[phone] Current Discharge Medication List  
  
START taking these medications Dose & Instructions Dispensing Information Comments Morning Noon Evening Bedtime amoxicillin-clavulanate 500-125 mg per tablet Commonly known as:  AUGMENTIN Your next dose is: Today, Tomorrow Other:  _________ Dose:  1 Tab Take 1 Tab by mouth every twelve (12) hours. Quantity:  10 Tab Refills:  0  
     
   
   
   
  
 warfarin 5 mg tablet Commonly known as:  COUMADIN Your next dose is: Today, Tomorrow Other:  _________ Dose:  5 mg Take 1 Tab by mouth daily. Quantity:  30 Tab Refills:  0 CONTINUE these medications which have CHANGED Dose & Instructions Dispensing Information Comments Morning Noon Evening Bedtime  
 memantine 10 mg tablet Commonly known as:  Adryan Net What changed:  how much to take Your next dose is: Today, Tomorrow Other:  _________ Dose:  5 mg Take 0.5 Tabs by mouth two (2) times a day. Indications: Per son Dr. Radha Martinez stated pt should be taking Quantity:  180 Tab Refills:  1 CONTINUE these medications which have NOT CHANGED Dose & Instructions Dispensing Information Comments Morning Noon Evening Bedtime  
 calcium-vitamin D 500 mg(1,250mg) -200 unit per tablet Commonly known as:  OYSTER SHELL Your next dose is: Today, Tomorrow Other:  _________ Dose:  1 Tab Take 1 Tab by mouth two (2) times daily (with meals). Refills:  0  
     
   
   
   
  
 cholecalciferol 1,000 unit tablet Commonly known as:  VITAMIN D3 Your next dose is: Today, Tomorrow Other:  _________ Dose:  1000 Units Take 1,000 Units by mouth daily. Refills:  0  
     
   
   
   
  
 denosumab 60 mg/mL injection Commonly known as:  Pricila Rasp Your next dose is: Today, Tomorrow Other:  _________ Dose:  60 mg  
60 mg by SubCUTAneous route every 6 months. Every six months Refills:  0  
     
   
   
   
  
 donepezil 5 mg tablet Commonly known as:  ARICEPT  
 Your next dose is: Today, Tomorrow Other:  _________ Dose:  5 mg Take 1 Tab by mouth nightly for 90 days. Quantity:  90 Tab Refills:  1  
     
   
   
   
  
 ferrous gluconate 324 mg (38 mg iron) tablet Your next dose is: Today, Tomorrow Other:  _________ TAKE 1 TABLET BY MOUTH DAILY WITH BREAKFAST Quantity:  30 Tab Refills:  2 NexIUM 20 mg capsule Generic drug:  esomeprazole Your next dose is: Today, Tomorrow Other:  _________ Dose:  20 mg Take 20 mg by mouth daily. Refills:  0  
     
   
   
   
  
 predniSONE 5 mg tablet Commonly known as:  Nadege Terence Your next dose is: Today, Tomorrow Other:  _________ Dose:  10 mg Take 10 mg by mouth every evening. Indications: Viral Hepatitis Refills:  0  
     
   
   
   
  
 VITAMIN B-12 1,000 mcg tablet Generic drug:  cyanocobalamin Your next dose is: Today, Tomorrow Other:  _________ Dose:  1000 mcg Take 1,000 mcg by mouth daily. Refills:  0 STOP taking these medications   
 rivaroxaban 20 mg Tab tablet Commonly known as:  Rock Snowball Where to Get Your Medications These [id number] [address] [address] [address] Phone:  [phone]  
  amoxicillin-clavulanate 500-125 mg per tablet  
 memantine 10 mg tablet Information on where to get these meds will be given to you by the nurse or doctor. ! Ask your nurse or doctor about these medications  
  warfarin 5 mg tablet Discharge Instructions ACUTE DIAGNOSES: 
Acute metabolic encephalopathy CHRONIC MEDICAL DIAGNOSES: 
Problem List as of 3/3/2017  Date Reviewed: 3/3/2017 Codes Class Noted - Resolved GI bleed ICD-10-CM: K92.2 ICD-9-CM: 578.9  11/22/2015 - Present Cirrhosis of liver ([facility] ICD-10-CM: K74.60 ICD-9-CM: 571.5  Unknown - Present Osteoporosis ICD-10-CM: M81.0 ICD-9-CM: 733.00  Unknown - Present CKD (chronic kidney disease) stage 3, GFR 30-59 ml/min (Chronic) ICD-10-CM: N18.3 ICD-9-CM: 585.3  2/27/2013 - Present Hx of deep vein thrombophlebitis of lower extremity ICD-10-CM: Y30.16 
ICD-9-CM: V12.52  2/27/2013 - Present Hx pulmonary embolism ICD-10-CM: M69.397 ICD-9-CM: V12.55  2/27/2013 - Present Dementia ICD-10-CM: F03.90 ICD-9-CM: 294.20  5/23/2011 - Present Autoimmune hepatitis ([facility].) (Chronic) ICD-10-CM: K75.4 ICD-9-CM: 571.42  2/26/2010 - Present Overview Addendum 2/26/2010 10:56 AM by Susan Knight MD  
  Goes to [facility]- had a recent even and was anemic- had a DVT 1/24/10- colon 10/09 normal- biopsy has shown the cirrhosis RESOLVED: Acute metabolic encephalopathy IDP-60-DI: G93.41 
ICD-9-CM: 348.31  2/25/2017 - 3/3/2017 RESOLVED: Dehydration ICD-10-CM: E86.0 ICD-9-CM: 276.51  2/25/2017 - 3/3/2017 RESOLVED: Lactic acidosis ICD-10-CM: E87.2 ICD-9-CM: 276.2  2/25/2017 - 3/3/2017 RESOLVED: Leukocytosis, unspecified ICD-10-CM: U43.808 ICD-9-CM: 288.60  4/6/2014 - 11/23/2015 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 ICD-9-CM: V10.52  Unknown - 11/23/2015 RESOLVED: Abdominal pain ICD-10-CM: R10.9 ICD-9-CM: 789.00  4/6/2014 - 11/23/2015 RESOLVED: Fever, unspecified ICD-10-CM: R50.9 ICD-9-CM: 780.60  4/6/2014 - 11/22/2015 RESOLVED: Other pulmonary embolism and infarction ICD-10-CM: I26.99 
ICD-9-CM: 415.19  3/5/2013 - 11/23/2015 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 ICD-9-CM: 054.43  2/27/2013 - 11/23/2015  RESOLVED: Saddle embolus of pulmonary artery without acute cor pulmonale (HCC) ICD-10-CM: Z53.82 
 ICD-9-CM: 415.13  2/27/2013 - 2/28/2013 RESOLVED: Left leg DVT (HCC) ICD-10-CM: U73.034 ICD-9-CM: 453.40  2/27/2013 - 2/28/2013 RESOLVED: Pneumonia ICD-10-CM: J18.9 ICD-9-CM: 750  2/21/2013 - 2/27/2013 RESOLVED: Leukocytosis ICD-10-CM: A87.824 ICD-9-CM: 288.60  2/21/2013 - 2/27/2013 RESOLVED: SIRS (systemic inflammatory response syndrome) (HCC) ICD-10-CM: R65.10 ICD-9-CM: 995.90  2/21/2013 - 3/3/2017 RESOLVED: Renal cell carcinoma (HCC) (Chronic) ICD-10-CM: C64.9 ICD-9-CM: 189.0  2/26/2010 - 4/6/2014 Overview Signed 2/26/2010 10:51 AM by Jaison Black MD  
  Right kidney removed RESOLVED: Anemia (Chronic) ICD-10-CM: D64.9 ICD-9-CM: 285.9  2/26/2010 - 2/27/2013 Overview Signed 2/26/2010 10:55 AM by Jaison Black MD  
  Due to imuran DISCHARGE MEDICATIONS:  
  
 
 
· It is important that you take the medication exactly as they are prescribed. · Keep your medication in the bottles provided by the pharmacist and keep a list of the medication names, dosages, and times to be taken in your wallet. · Do not take other medications without consulting your doctor. DIET:  Cardiac Diet ACTIVITY: Activity as tolerated Keep INR 2-3 ADDITIONAL INFORMATION: If you experience any of the following symptoms then please call your primary care physician or return to the emergency room if you cannot get hold of your doctor: Fever, chills, nausea, vomiting, diarrhea, change in mentation, falling, bleeding, shortness of breath. FOLLOW UP CARE: 
Dr. Mary Ann Wellington MD  you are to call and set up an appointment to see them in 2 weeks. Information obtained by : 
I understand that if any problems occur once I am at home I am to contact my physician. I understand and acknowledge receipt of the instructions indicated above. Physician's or R.N.'s Signature                                                                  Date/Time Patient or Representative Signature                                                          Date/Time 
 
Nutrition Recommendations For Discharge: 
 
Continue Oral Nutrition Supplements at discharge: Ensure Compact twice daily between meals  
for 30 days unless otherwise directed by your Primary Care Physician. This product can be purchased at your local grocery store or drug store and online. Colin Cecil Kelly Alex, [address] Discharge Orders None General Information Please provide this summary of care documentation to your next provider. Introducing [facility] [place] [facility]! Dear Amaury De Luna: 
Thank you for requesting a [employer] account. Our records indicate that you already have an active [employer] account. You can access your account anytime at [email or web address] Did you know that you can access your hospital and ER discharge instructions at any time in [employer]? You can also review all of your test results from your hospital stay or ER visit. Additional Information If you have questions, please visit the Frequently Asked Questions section of the [employer] website at [email or web address] Remember, [employer] is NOT to be used for urgent needs. For medical emergencies, dial 911. Now available from your iPhone and Android! Patient Signature:  ____________________________________________________________ Date:  ____________________________________________________________  
  
Clarence Porter Provider Signature:  ____________________________________________________________ Date:  ____________________________________________________________

## 2017-02-25 NOTE — IP AVS SNAPSHOT
Lashell Kearns 
 
 
 [address] [place] [address] 
[phone] Patient: Afshan Ruby MRN: [id number] SHP:28/25/[birth year]06 You are allergic to the following No active allergies Recent Documentation Height  
  
  
  
  
  
 1.651 m Emergency Contacts Name Discharge Info Relation Home Work Mobile Alex Brody DISCHARGE CAREGIVER [3] Child [2] [phone] About your hospitalization You were admitted on:  February 25, 2017 [facility]  MED SURG 2 You were discharged on:  March 3, 2017 Unit phone number:  [phone] Why you were hospitalized Your primary diagnosis was:  Not on File Your diagnoses also included:  Acute Metabolic Encephalopathy, Autoimmune Hepatitis (Hcc), Ckd (Chronic Kidney Disease) Stage 3, Gfr 30-59 Ml/Min, Cirrhosis Of Liver (Hcc), Dementia, Hx Pulmonary Embolism, Sirs (Systemic Inflammatory Response Syndrome) (Hcc), Dehydration, Lactic Acidosis Providers Seen During Your Hospitalizations Provider Role Specialty Primary office phone Rahat Hernandez MD Attending Provider Emergency Medicine [phone] Pete Dallas DO Attending Provider Internal Medicine [phone] Mich Cohen MD Attending Provider Internal Medicine [phone] Tana Mcknight MD Attending Provider Hospitalist [phone] Your Primary Care Physician (PCP) Primary Care Physician Office Phone Office Fax [place], [address] [facility] [phone] Follow-up Information Follow up With Details Comments Contact Info Shauna Miller MD   [address] [facility] [address] [place] [address] 
[phone] Current Discharge Medication List  
  
START taking these medications Dose & Instructions Dispensing Information Comments Morning Noon Evening Bedtime  
 amoxicillin-clavulanate 500-125 mg per tablet Commonly known as:  AUGMENTIN Your next dose is: Today, Tomorrow Other:  _________ Dose:  1 Tab Take 1 Tab by mouth every twelve (12) hours. Quantity:  10 Tab Refills:  0  
     
   
   
   
  
 warfarin 5 mg tablet Commonly known as:  COUMADIN Your next dose is: Today, Tomorrow Other:  _________ Dose:  5 mg Take 1 Tab by mouth daily. Quantity:  30 Tab Refills:  0 CONTINUE these medications which have CHANGED Dose & Instructions Dispensing Information Comments Morning Noon Evening Bedtime  
 memantine 10 mg tablet Commonly known as:  Fabio Amaya What changed:  how much to take Your next dose is: Today, Tomorrow Other:  _________ Dose:  5 mg Take 0.5 Tabs by mouth two (2) times a day. Indications: Per son Dr. John Choi stated pt should be taking Quantity:  180 Tab Refills:  1 CONTINUE these medications which have NOT CHANGED Dose & Instructions Dispensing Information Comments Morning Noon Evening Bedtime  
 calcium-vitamin D 500 mg(1,250mg) -200 unit per tablet Commonly known as:  OYSTER SHELL Your next dose is: Today, Tomorrow Other:  _________ Dose:  1 Tab Take 1 Tab by mouth two (2) times daily (with meals). Refills:  0  
     
   
   
   
  
 cholecalciferol 1,000 unit tablet Commonly known as:  VITAMIN D3 Your next dose is: Today, Tomorrow Other:  _________ Dose:  1000 Units Take 1,000 Units by mouth daily. Refills:  0  
     
   
   
   
  
 denosumab 60 mg/mL injection Commonly known as:  Dollbernadette Caulk Your next dose is: Today, Tomorrow Other:  _________ Dose:  60 mg  
60 mg by SubCUTAneous route every 6 months. Every six months Refills:  0  
     
   
   
   
  
 donepezil 5 mg tablet Commonly known as:  ARICEPT Your next dose is: Today, Tomorrow Other:  _________ Dose:  5 mg Take 1 Tab by mouth nightly for 90 days. Quantity:  90 Tab Refills:  1  
     
   
   
   
  
 ferrous gluconate 324 mg (38 mg iron) tablet Your next dose is: Today, Tomorrow Other:  _________ TAKE 1 TABLET BY MOUTH DAILY WITH BREAKFAST Quantity:  30 Tab Refills:  2 NexIUM 20 mg capsule Generic drug:  esomeprazole Your next dose is: Today, Tomorrow Other:  _________ Dose:  20 mg Take 20 mg by mouth daily. Refills:  0  
     
   
   
   
  
 predniSONE 5 mg tablet Commonly known as:  Danny Dally Your next dose is: Today, Tomorrow Other:  _________ Dose:  10 mg Take 10 mg by mouth every evening. Indications: Viral Hepatitis Refills:  0  
     
   
   
   
  
 VITAMIN B-12 1,000 mcg tablet Generic drug:  cyanocobalamin Your next dose is: Today, Tomorrow Other:  _________ Dose:  1000 mcg Take 1,000 mcg by mouth daily. Refills:  0 STOP taking these medications   
 rivaroxaban 20 mg Tab tablet Commonly known as:  Eugene Sorto Where to Get Your Medications These medications were sent to [facility]/pharmacy #[id number]- [place], [address] ([place] [address])  [address] [place] [address] Phone:  [phone]  
  amoxicillin-clavulanate 500-125 mg per tablet  
 memantine 10 mg tablet Information on where to get these meds will be given to you by the nurse or doctor. ! Ask your nurse or doctor about these medications  
  warfarin 5 mg tablet Discharge Instructions ACUTE DIAGNOSES: 
Acute metabolic encephalopathy CHRONIC MEDICAL DIAGNOSES: 
Problem List as of 3/3/2017  Date Reviewed: 3/3/2017 Codes Class Noted - Resolved GI bleed ICD-10-CM: K92.2 ICD-9-CM: 578.9  11/22/2015 - Present Cirrhosis of liver ([facility] ICD-10-CM: K74.60 ICD-9-CM: 571.5  Unknown - Present Osteoporosis ICD-10-CM: M81.0 ICD-9-CM: 733.00  Unknown - Present CKD (chronic kidney disease) stage 3, GFR 30-59 ml/min (Chronic) ICD-10-CM: N18.3 ICD-9-CM: 585.3  2/27/2013 - Present Hx of deep vein thrombophlebitis of lower extremity ICD-10-CM: A54.72 
ICD-9-CM: V12.52  2/27/2013 - Present Hx pulmonary embolism ICD-10-CM: B77.807 ICD-9-CM: V12.55  2/27/2013 - Present Dementia ICD-10-CM: F03.90 ICD-9-CM: 294.20  5/23/2011 - Present Autoimmune hepatitis ([facility] (Chronic) ICD-10-CM: K75.4 ICD-9-CM: 571.42  2/26/2010 - Present Overview Addendum 2/26/2010 10:56 AM by Harpreet Elias MD  
  Goes to [facility]- had a recent even and was anemic- had a DVT 1/24/10- colon 10/09 normal- biopsy has shown the cirrhosis RESOLVED: Acute metabolic encephalopathy YUMIKO-39-QY: G93.41 
ICD-9-CM: 348.31  2/25/2017 - 3/3/2017 RESOLVED: Dehydration ICD-10-CM: E86.0 ICD-9-CM: 276.51  2/25/2017 - 3/3/2017 RESOLVED: Lactic acidosis ICD-10-CM: E87.2 ICD-9-CM: 276.2  2/25/2017 - 3/3/2017 RESOLVED: Leukocytosis, unspecified ICD-10-CM: X27.851 ICD-9-CM: 288.60  4/6/2014 - 11/23/2015 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 ICD-9-CM: V10.52  Unknown - 11/23/2015 RESOLVED: Abdominal pain ICD-10-CM: R10.9 ICD-9-CM: 789.00  4/6/2014 - 11/23/2015 RESOLVED: Fever, unspecified ICD-10-CM: R50.9 ICD-9-CM: 780.60  4/6/2014 - 11/22/2015 RESOLVED: Other pulmonary embolism and infarction ICD-10-CM: I26.99 
ICD-9-CM: 415.19  3/5/2013 - 11/23/2015 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 ICD-9-CM: 054.43  2/27/2013 - 11/23/2015  RESOLVED: Saddle embolus of pulmonary artery without acute cor pulmonale (HCC) ICD-10-CM: Y20.97 
 ICD-9-CM: 415.13  2/27/2013 - 2/28/2013 RESOLVED: Left leg DVT (HCC) ICD-10-CM: J64.891 ICD-9-CM: 453.40  2/27/2013 - 2/28/2013 RESOLVED: Pneumonia ICD-10-CM: J18.9 ICD-9-CM: 758  2/21/2013 - 2/27/2013 RESOLVED: Leukocytosis ICD-10-CM: U43.122 ICD-9-CM: 288.60  2/21/2013 - 2/27/2013 RESOLVED: SIRS (systemic inflammatory response syndrome) (HCC) ICD-10-CM: R65.10 ICD-9-CM: 995.90  2/21/2013 - 3/3/2017 RESOLVED: Renal cell carcinoma (HCC) (Chronic) ICD-10-CM: C64.9 ICD-9-CM: 189.0  2/26/2010 - 4/6/2014 Overview Signed 2/26/2010 10:51 AM by Rodrigo Mcneill MD  
  Right kidney removed RESOLVED: Anemia (Chronic) ICD-10-CM: D64.9 ICD-9-CM: 285.9  2/26/2010 - 2/27/2013 Overview Signed 2/26/2010 10:55 AM by Rodrigo Mcneill MD  
  Due to imuran DISCHARGE MEDICATIONS:  
  
 
 
· It is important that you take the medication exactly as they are prescribed. · Keep your medication in the bottles provided by the pharmacist and keep a list of the medication names, dosages, and times to be taken in your wallet. · Do not take other medications without consulting your doctor. DIET:  Cardiac Diet ACTIVITY: Activity as tolerated Keep INR 2-3 ADDITIONAL INFORMATION: If you experience any of the following symptoms then please call your primary care physician or return to the emergency room if you cannot get hold of your doctor: Fever, chills, nausea, vomiting, diarrhea, change in mentation, falling, bleeding, shortness of breath. FOLLOW UP CARE: 
Dr. Rodrigo Mcneill MD  you are to call and set up an appointment to see them in 2 weeks. Information obtained by : 
I understand that if any problems occur once I am at home I am to contact my physician. I understand and acknowledge receipt of the instructions indicated above. Physician's or R.N.'s Signature                                                                  Date/Time Patient or Representative Signature                                                          Date/Time 
 
Nutrition Recommendations For Discharge: 
 
Continue Oral Nutrition Supplements at discharge: Ensure Compact twice daily between meals  
for 30 days unless otherwise directed by your Primary Care Physician. This product can be purchased at your local grocery store or drug store and online. Colin Johnson, [address] Discharge Orders None [employer] Announcement We are excited to announce that we are making your provider's discharge notes available to you in [employer]. You will see these notes when they are completed and signed by the physician that discharged you from your recent hospital stay. If you have any questions or concerns about any information you see in [employer], please call the Health Information Department where you were seen or reach out to your Primary Care Provider for more information about your plan of care. Introducing [facility] & HEALTH SERVICES! Dear Amaury De Luna: 
Thank you for requesting a [employer] account. Our records indicate that you already have an active [employer] account. You can access your account anytime at [email or web address] Did you know that you can access your hospital and ER discharge instructions at any time in [employer]? You can also review all of your test results from your hospital stay or ER visit. Additional Information If you have questions, please visit the Frequently Asked Questions section of the [employer] website at [email or web address] Remember, [employer] is NOT to be used for urgent needs.  For medical emergencies, dial 911. Now available from your iPhone and Android! General Information Please provide this summary of care documentation to your next provider. Patient Signature:  ____________________________________________________________ Date:  ____________________________________________________________  
  
Earnstine Luciano Provider Signature:  ____________________________________________________________ Date:  ____________________________________________________________

## 2017-02-25 NOTE — ED TRIAGE NOTES
Comes by EMS for ground level fall. Was possibly on the floor a couple of days. Family has not seen or heard from her for 2 days. Pt lives at home alone.

## 2017-02-25 NOTE — IP AVS SNAPSHOT
Current Discharge Medication List  
  
Take these medications at their scheduled times Dose & Instructions Dispensing Information Comments Morning Noon Evening Bedtime  
 amoxicillin-clavulanate 500-125 mg per tablet Commonly known as:  AUGMENTIN Your next dose is: Today, Tomorrow Other:  ____________ Dose:  1 Tab Take 1 Tab by mouth every twelve (12) hours. Quantity:  10 Tab Refills:  0  
     
   
   
   
  
 calcium-vitamin D 500 mg(1,250mg) -200 unit per tablet Commonly known as:  OYSTER SHELL Your next dose is: Today, Tomorrow Other:  ____________ Dose:  1 Tab Take 1 Tab by mouth two (2) times daily (with meals). Refills:  0  
     
   
   
   
  
 cholecalciferol 1,000 unit tablet Commonly known as:  VITAMIN D3 Your next dose is: Today, Tomorrow Other:  ____________ Dose:  1000 Units Take 1,000 Units by mouth daily. Refills:  0  
     
   
   
   
  
 denosumab 60 mg/mL injection Commonly known as:  Janusz Sportsman Your next dose is: Today, Tomorrow Other:  ____________ Dose:  60 mg  
60 mg by SubCUTAneous route every 6 months. Every six months Refills:  0  
     
   
   
   
  
 donepezil 5 mg tablet Commonly known as:  ARICEPT Your next dose is: Today, Tomorrow Other:  ____________ Dose:  5 mg Take 1 Tab by mouth nightly for 90 days. Quantity:  90 Tab Refills:  1  
     
   
   
   
  
 memantine 10 mg tablet Commonly known as:  Merrishadyn Ree Your next dose is: Today, Tomorrow Other:  ____________ Dose:  5 mg Take 0.5 Tabs by mouth two (2) times a day. Indications: Per son Dr. Homero Dance stated pt should be taking Quantity:  180 Tab Refills:  1 NexIUM 20 mg capsule Generic drug:  esomeprazole Your next dose is: Today, Tomorrow Other:  ____________  Dose:  20 mg  
 Take 20 mg by mouth daily. Refills:  0  
     
   
   
   
  
 predniSONE 5 mg tablet Commonly known as:  Walsh Stair Your next dose is: Today, Tomorrow Other:  ____________ Dose:  10 mg Take 10 mg by mouth every evening. Indications: Viral Hepatitis Refills:  0  
     
   
   
   
  
 VITAMIN B-12 1,000 mcg tablet Generic drug:  cyanocobalamin Your next dose is: Today, Tomorrow Other:  ____________ Dose:  1000 mcg Take 1,000 mcg by mouth daily. Refills:  0  
     
   
   
   
  
 warfarin 5 mg tablet Commonly known as:  COUMADIN Your next dose is: Today, Tomorrow Other:  ____________ Dose:  5 mg Take 1 Tab by mouth daily. Quantity:  30 Tab Refills:  0 Take these medications as directed Dose & Instructions Dispensing Information Comments Morning Noon Evening Bedtime  
 ferrous gluconate 324 mg (38 mg iron) tablet Your next dose is: Today, Tomorrow Other:  ____________ TAKE 1 TABLET BY MOUTH DAILY WITH BREAKFAST Quantity:  30 Tab Refills:  2 Where to Get Your Medications These medications were sent to [id number] [address] ([place] [address])  [address], [place] [address] Phone:  [phone]  
  amoxicillin-clavulanate 500-125 mg per tablet  
 memantine 10 mg tablet Information about where to get these medications is not yet available ! Ask your nurse or doctor about these medications  
  warfarin 5 mg tablet

## 2017-02-26 NOTE — PROGRESS NOTES
[facility]: MED RECONCILIATION    Comments/Recommendations:    Interview conducted with the patient's son at bedside   Prednisone is take for a history of viral hepatitis and the patient has had flares when her prednisone dose has been reduced   Rivaroxaban is taken for a history of blood clots (the patient has had three clots)   Based on her pill box found this evening her son thinks she last took medications on 2/23   Crcl 18.4 ml/min  o Namenda - Renal impairment, CrCl, 5 to 29 mL/min: Immediate-release, reduce dosage to 5 mg ORALLY twice daily   o Xarelto - Renal impairment in treatment of DVT or pulmonary embolism (PE) and prevention of recurrent DVT or PE, CrCl less than 30 mL/min: Avoid use    Significant PMH/Disease States:   Patient Active Problem List   Diagnosis Code    Autoimmune hepatitis ([email or web address] K75.4    Dementia F03.90    SIRS (systemic inflammatory response syndrome) (HCC) R65.10    CKD (chronic kidney disease) stage 3, GFR 30-59 ml/min N18.3    Cirrhosis of liver ([facility] K74.60    Hx of deep vein thrombophlebitis of lower extremity Z86.72    Hx pulmonary embolism Z86.711    Osteoporosis M81.0    GI bleed K92.2    Acute metabolic encephalopathy E79.70    Dehydration E86.0    Lactic acidosis E87.2     Past Medical History:   Diagnosis Date    Anemia 2/26/2010    pancytopenia from [facility]    Autoimmune hepatitis ([facility]     Autoimmune hepatitis ([facility] 2/26/2010    Autoimmune hepatitis ([facility] 2/26/2010    Cirrhosis of liver (HCC)     Cirrhosis of liver (HCC)     CKD (chronic kidney disease) stage 3, GFR 30-59 ml/min     Dementia     HSV epithelial keratitis     Hx of deep vein thrombophlebitis of lower extremity 02/27/13    Hx of malignant carcinoid tumor of kidney 2001    Hx pulmonary embolism 02/27/13    Osteoporosis     Renal cell carcinoma ([facility] [address] 2/26/2010     Chief Complaint for this Admission:   Chief Complaint   Patient presents with    Fall     Allergies: Review of patient's allergies indicates no known allergies. Prior to Admission Medications:     Medication Documentation Review Audit       Reviewed by JOCELYN HooksD (Pharmacist) on 02/25/17 at 2023         Medication Sig Documenting Provider Last Dose Status Taking?      calcium-vitamin D (OYSTER SHELL) 500 mg(1,250mg) -200 unit per tablet Take 1 Tab by mouth two (2) times daily (with meals). Historical Provider 2/23/2017 Active Yes    cholecalciferol (VITAMIN D3) 1,000 unit tablet Take 1,000 Units by mouth daily. Historical Provider 2/23/2017 Active Yes    cyanocobalamin (VITAMIN B-12) 1,000 mcg tablet Take 1,000 mcg by mouth daily. Historical Provider 2/23/2017 Active Yes    Denosumab (PROLIA) 60 mg/mL injection 60 mg by SubCUTAneous route every 6 months. Every six months Pepe Tavares MD 10/2016 Active Yes             Med Note Kelly Traetomas Silva Feb 25, 2017  8:22 PM): .      donepezil (ARICEPT) 5 mg tablet Take 1 Tab by mouth nightly for 90 days. Kianna Mcgovern MD 2/23/2017 Active Yes    esomeprazole (NEXIUM) 20 mg capsule Take 20 mg by mouth daily. Historical Provider 2/23/2017 Active Yes    ferrous gluconate 324 mg (38 mg iron) tablet TAKE 1 TABLET BY MOUTH DAILY WITH BREAKFAST Kianna Mcgovern MD 2/23/2017 Active Yes    memantine (NAMENDA) 10 mg tablet Take 1 Tab by mouth two (2) times a day. Indications: Per son Dr. Miryam Robin stated pt should be taking Kianna Mcgovern MD 2/23/2017 Active Yes    predniSONE (DELTASONE) 5 mg tablet Take 10 mg by mouth every evening. Indications: Viral Hepatitis Historical Provider 2/23/2017 Active Yes             Med Note (Bhanu Holloway Nov 22, 2015  2:31 PM):        rivaroxaban (XARELTO) 20 mg tab tablet Take 1 Tab by mouth daily (with dinner) for 90 days.  Kianna Mcgovern MD 2/23/2017 Active Yes                  Thank you,    Crissy Rangel, PharmD, BCPS

## 2017-02-26 NOTE — PROGRESS NOTES
Bedside shift change report given to Sandy Chow (oncoming nurse) by [facility] RN (offgoing nurse). Report included the following information SBAR, Kardex, Intake/Output, MAR and Recent Results.

## 2017-02-26 NOTE — PROGRESS NOTES
Bedside shift change report given to Tyra Mchugh RN (oncoming nurse) by Rajiv Hawley RN (offgoing nurse). Report included the following information SBAR, Kardex and MAR.

## 2017-02-26 NOTE — ED NOTES
Pt sleeping in bed sr x2 .  Checked pt is clean and dry wick intact to wall suction small amount of urine noted in wick sponge

## 2017-02-26 NOTE — ED PROVIDER NOTES
HPI Comments: The pt is an 80year old female who presents to the ED via EMS having been found on the floor of her studio covered in stool and urine by her family. Her sister hadn't been able to reach her for the last two days. Pt recalls nothing until being awakened by her daughter in law. Dementia at baseline but independently able to care for herself. Pt denies fevers, chills, night sweats, chest pain, pressure, SOB, abdominal pain, n/v/d, melena, hematuria, dysuria, constipation, HA, dizziness, and syncope. She does have pain on the left side of her upper back. 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 female presenting with fall. The history is provided by the patient. Fall   Pertinent negatives include no fever, no abdominal pain, no nausea, no vomiting, no hematuria and no headaches.         Past Medical History:   Diagnosis Date    Anemia 2/26/2010    pancytopenia from [facility]    Autoimmune hepatitis ([address]     Autoimmune hepatitis ([address] 2/26/2010    Autoimmune hepatitis ([address] 2/26/2010    Cirrhosis of liver (HCC)     Cirrhosis of liver (HCC)     CKD (chronic kidney disease) stage 3, GFR 30-59 ml/min     Dementia     HSV epithelial keratitis     Hx of deep vein thrombophlebitis of lower extremity 02/27/13    Hx of malignant carcinoid tumor of kidney 2001    Hx pulmonary embolism 02/27/13    Osteoporosis     Ren[facility] cell carcinoma [facility] [address] 2/26/2010       Past Surgical History:   Procedure Laterality Date    HX HYSTERECTOMY      HX NEPHRECTOMY  2001    right         Family History:   Problem Relation Age of Onset    Heart Failure Mother     Cancer Father      unknown type    Heart Disease Sister     Cancer Brother      melanoma    Cancer Brother      unknown type       Social History     Social History    Marital status:      Spouse name: N/A    Number of children: N/A    Years of education: N/A     Occupational History     Retired     Social History Main Topics    Smoking status: Never Smoker    Smokeless tobacco: Never Used    Alcohol use No    Drug use: No    Sexual activity: No     Other Topics Concern    Not on file     Social History Narrative         ALLERGIES: Review of patient's allergies indicates no known allergies. Review of Systems   Constitutional: Negative for activity change, appetite change, chills, diaphoresis, fatigue, fever and unexpected weight change. HENT: Negative for congestion, ear pain, rhinorrhea, sinus pressure, sore throat and tinnitus. Eyes: Negative for photophobia, pain, discharge and visual disturbance. Respiratory: Negative for apnea, cough, choking, chest tightness, shortness of breath, wheezing and stridor. Cardiovascular: Negative for chest pain, palpitations and leg swelling. Gastrointestinal: Negative for abdominal pain, constipation, diarrhea, nausea and vomiting. Endocrine: Negative for polydipsia, polyphagia and polyuria. Genitourinary: Negative for decreased urine volume, dyspareunia, dysuria, enuresis, flank pain, frequency, hematuria and urgency. Musculoskeletal: Positive for back pain. Negative for arthralgias, gait problem, myalgias and neck pain. Skin: Negative for color change, pallor, rash and wound.    Allergic/Immunologic: Negative for immunocompromised state. Neurological: Negative for dizziness, seizures, syncope, weakness, light-headedness and headaches. Hematological: Does not bruise/bleed easily. Psychiatric/Behavioral: Negative for agitation and confusion. The patient is not nervous/anxious. Vitals:    02/25/17 1735 02/25/17 1745 02/25/17 1820   BP: (!) 143/102 154/60 135/77   Pulse: (!) 114 (!) 106 (!) 111   Resp: 28 15 26   Temp: 98 °F (36.7 °C)     SpO2: 94% 94% 94%   Weight: 59 kg (130 lb)     Height: 5' 5\" (1.651 m)              Physical Exam   Constitutional: She appears well-developed and well-nourished. No distress. HENT:   Head: Normocephalic. Right Ear: External ear normal.   Left Ear: External ear normal.   Mouth/Throat: Oropharynx is clear and moist. No oropharyngeal exudate. Eyes: Conjunctivae and EOM are normal. Pupils are equal, round, and reactive to light. Right eye exhibits no discharge. Left eye exhibits no discharge. No scleral icterus. Neck: Normal range of motion and full passive range of motion without pain. Neck supple. No JVD present. No spinous process tenderness and no muscular tenderness present. No rigidity. No tracheal deviation, no edema, no erythema and normal range of motion present. No thyromegaly present. Cardiovascular: Regular rhythm, normal heart sounds, intact distal pulses and normal pulses. Tachycardia present. Exam reveals no gallop and no friction rub. No murmur heard. Pulmonary/Chest: Effort normal. No stridor. No respiratory distress. She has decreased breath sounds in the right lower field and the left lower field. She has no wheezes. She has no rhonchi. She has no rales. She exhibits no tenderness. Abdominal: Soft. Bowel sounds are normal. She exhibits distension. She exhibits no mass. There is no hepatosplenomegaly. There is no tenderness.  There is no rigidity, no rebound, no guarding, no CVA tenderness, no tenderness at McBurney's point and negative Reeves's sign. Musculoskeletal: Normal range of motion. She exhibits no edema. Right hip: Normal.        Left hip: Normal.        Cervical back: Normal.        Thoracic back: She exhibits tenderness and bony tenderness. Lumbar back: Normal.        Legs:  Lymphadenopathy:     She has no cervical adenopathy. Neurological: She is alert. She has normal strength. She displays tremor. No cranial nerve deficit or sensory deficit. GCS eye subscore is 4. GCS verbal subscore is 4. GCS motor subscore is 6. Skin: Skin is warm and dry. No rash noted. She is not diaphoretic. No erythema. No pallor. Psychiatric: She has a normal mood and affect. Her behavior is normal. Judgment and thought content normal.   Nursing note and vitals reviewed. MDM  Number of Diagnoses or Management Options  Diagnosis management comments:    * routine laboratory data and UA   * IVF   * CT head, neck, abd/pelvis   * XR series   * Consult to hospitalist        Amount and/or Complexity of Data Reviewed  Clinical lab tests: ordered and reviewed  Tests in the radiology section of CPT®: ordered and reviewed  Review and summarize past medical records: yes  Discuss the patient with other providers: yes    Risk of Complications, Morbidity, and/or Mortality  General comments:    - hemodynamically stable pt at present    Patient Progress  Patient progress: stable    ED Course       Procedures      ED EKG interpretation:  Rhythm: sinus tachycardia; and regular . Rate (approx.): 112; Axis: normal; P wave: normal; QRS interval: normal ; ST/T wave: normal; in  Lead: frequent PVCs This EKG was interpreted by Mattie Lucia NP,ED Provider. CONSULT NOTE:   7:42 PM  Mattie Lucia NP spoke with Dr. Isis Gonzales MD,   Specialty: Hospitalist   Discussed pt's hx, disposition, and available diagnostic and imaging results. Reviewed care plans. Consultant agrees with plans as outlined. Admit to inpatient.  Mattie Lucia NP    7:42 PM  Patient is being admitted to the hospital.  The results of their tests and reasons for their admission have been discussed with them and/or available family. They convey agreement and understanding for the need to be admitted and for their admission diagnosis. Consultation has been made with the inpatient physician specialist for hospitalization. LABORATORY TESTS:  Recent Results (from the past 12 hour(s))   EKG, 12 LEAD, INITIAL    Collection Time: 02/25/17  5:42 PM   Result Value Ref Range    Ventricular Rate 112 BPM    Atrial Rate 112 BPM    P-R Interval 126 ms    QRS Duration 70 ms    Q-T Interval 340 ms    QTC Calculation (Bezet) 464 ms    Calculated P Axis -7 degrees    Calculated R Axis 34 degrees    Calculated T Axis 35 degrees    Diagnosis       Sinus tachycardia with frequent premature ventricular complexes  Otherwise normal ECG  When compared with ECG of 22-NOV-2015 13:50,  premature ventricular complexes are now present     METABOLIC PANEL, COMPREHENSIVE    Collection Time: 02/25/17  6:00 PM   Result Value Ref Range    Sodium 139 136 - 145 mmol/L    Potassium 4.8 3.5 - 5.1 mmol/L    Chloride 100 97 - 108 mmol/L    CO2 20 (L) 21 - 32 mmol/L    Anion gap 19 (H) 5 - 15 mmol/L    Glucose 191 (H) 65 - 100 mg/dL    BUN 70 (H) 6 - 20 MG/DL    Creatinine 2.01 (H) 0.55 - 1.02 MG/DL    BUN/Creatinine ratio 35 (H) 12 - 20      GFR est AA 29 (L) >60 ml/min/1.73m2    GFR est non-AA 24 (L) >60 ml/min/1.73m2    Calcium 9.2 8.5 - 10.1 MG/DL    Bilirubin, total 1.7 (H) 0.2 - 1.0 MG/DL    ALT (SGPT) 22 12 - 78 U/L    AST (SGOT) 46 (H) 15 - 37 U/L    Alk.  phosphatase 62 45 - 117 U/L    Protein, total 7.8 6.4 - 8.2 g/dL    Albumin 3.2 (L) 3.5 - 5.0 g/dL    Globulin 4.6 (H) 2.0 - 4.0 g/dL    A-G Ratio 0.7 (L) 1.1 - 2.2     CBC WITH AUTOMATED DIFF    Collection Time: 02/25/17  6:00 PM   Result Value Ref Range    WBC 19.9 (H) 3.6 - 11.0 K/uL    RBC 4.98 3.80 - 5.20 M/uL    HGB 15.3 11.5 - 16.0 g/dL    HCT 46.0 35.0 - 47.0 %    MCV 92.4 80.0 - 99.0 FL    MCH 30.7 26.0 - 34.0 PG    MCHC 33.3 30.0 - 36.5 g/dL    RDW 15.9 (H) 11.5 - 14.5 %    PLATELET 171 804 - 762 K/uL    NEUTROPHILS 90 (H) 32 - 75 %    LYMPHOCYTES 4 (L) 12 - 49 %    MONOCYTES 6 5 - 13 %    EOSINOPHILS 0 0 - 7 %    BASOPHILS 0 0 - 1 %    ABS. NEUTROPHILS 17.9 (H) 1.8 - 8.0 K/UL    ABS. LYMPHOCYTES 0.8 0.8 - 3.5 K/UL    ABS. MONOCYTES 1.2 (H) 0.0 - 1.0 K/UL    ABS. EOSINOPHILS 0.0 0.0 - 0.4 K/UL    ABS.  BASOPHILS 0.0 0.0 - 0.1 K/UL    RBC COMMENTS NORMOCYTIC, NORMOCHROMIC     LIPASE    Collection Time: 02/25/17  6:00 PM   Result Value Ref Range    Lipase 182 73 - 393 U/L   PROTHROMBIN TIME + INR    Collection Time: 02/25/17  6:00 PM   Result Value Ref Range    INR 1.1 0.9 - 1.1      Prothrombin time 10.9 9.0 - 11.1 sec   PTT    Collection Time: 02/25/17  6:00 PM   Result Value Ref Range    aPTT 26.3 22.1 - 32.5 sec    aPTT, therapeutic range     58.0 - 77.0 SECS   CK    Collection Time: 02/25/17  6:00 PM   Result Value Ref Range     (H) 26 - 192 U/L   TROPONIN I    Collection Time: 02/25/17  6:00 PM   Result Value Ref Range    Troponin-I, Qt. 0.08 (H) <0.05 ng/mL   MAGNESIUM    Collection Time: 02/25/17  6:00 PM   Result Value Ref Range    Magnesium 2.6 (H) 1.6 - 2.4 mg/dL   URINALYSIS W/MICROSCOPIC    Collection Time: 02/25/17  6:22 PM   Result Value Ref Range    Color DARK YELLOW      Appearance CLOUDY (A) CLEAR      Specific gravity 1.024 1.003 - 1.030      pH (UA) 5.0 5.0 - 8.0      Protein 30 (A) NEG mg/dL    Glucose NEGATIVE  NEG mg/dL    Ketone NEGATIVE  NEG mg/dL    Blood NEGATIVE  NEG      Urobilinogen 0.2 0.2 - 1.0 EU/dL    Nitrites NEGATIVE  NEG      Leukocyte Esterase NEGATIVE  NEG      WBC 0-4 0 - 4 /hpf    RBC 0-5 0 - 5 /hpf    Epithelial cells FEW FEW /lpf    Bacteria NEGATIVE  NEG /hpf    Amorphous Crystals 1+ (A) NEG    Granular cast 2-5 (A) NEG /lpf   BILIRUBIN, CONFIRM    Collection Time: 02/25/17  6:22 PM   Result Value Ref Range    Bilirubin UA, confirm POSITIVE (A) NEG     LACTIC ACID, PLASMA    Collection Time: 02/25/17  6:33 PM   Result Value Ref Range    Lactic acid 3.1 (HH) 0.4 - 2.0 MMOL/L       IMAGING RESULTS:  CT ABD PELV WO CONT   Final Result      CT SPINE CERV WO CONT   Final Result      CT HEAD WO CONT   Final Result      XR PELV AP ONLY   Final Result      XR CHEST PA LAT   Final Result      XR SPINE LUMB 2 OR 3 V   Final Result      XR SPINE THORAC 2 V   Final Result        Xr Chest Pa Lat    Result Date: 2/25/2017  INDICATION:  collapse and fall COMPARISON: 4/9/2014 FINDINGS: PA and lateral views of the chest demonstrate a stable cardiomediastinal silhouette and clear lungs bilaterally. The visualized osseous structures are unremarkable. IMPRESSION: No acute process     Xr Spine Thorac 2 V    Result Date: 2/25/2017  EXAM:  XR SPINE THORAC 2 V INDICATION:   pain s/p fall COMPARISON: CT 4/6/2014. FINDINGS: AP, lateral and swimmers lateral views of the thoracic spine demonstrate normal alignment. No acute fracture or subluxation is identified. There is a chronic T12 compression fracture. IMPRESSION:  No acute osseous abnormality. Xr Spine Lumb 2 Or 3 V    Result Date: 2/25/2017  Clinical Indication:  pain s/p fall 3 views of the lumbar spine Comparison: None Findings: There is a degenerative levoscoliosis of the lumbar spine. Bones are severely osteopenic. There is no evidence of acute fracture or dislocation. There is a chronic T12 compression fracture. There is diffuse aortic atherosclerosis. Impression: 1. No acute osseous abnormality      Xr Pelv Ap Only    Result Date: 2/25/2017  EXAM:  XR PELV AP ONLY INDICATION:   fall COMPARISON: 2/4/2013. FINDINGS: An AP view of the pelvis demonstrates no fracture, lytic or blastic lesion or other acute abnormality. There is a chronic right pubis fracture. The bones are osteopenic and there is mild to moderate bilateral hip DJD.      IMPRESSION:  No acute osseous abnormality. Ct Head Wo Cont    Result Date: 2/25/2017  EXAM:  CT HEAD WO CONT INDICATION: Fall. Possibly on the floor for a couple of days. COMPARISON: CT head 5/17/2011. TECHNIQUE: Axial noncontrast head CT from foramen magnum to vertex. Coronal and sagittal reformatted images were obtained. CT dose reduction was achieved through use of a standardized protocol tailored for this examination and automatic exposure control for dose modulation. FINDINGS:  There is diffuse age-related parenchymal volume loss. The ventricles and sulci are age-appropriate without hydrocephalus. There is no mass effect or midline shift. There is no intracranial hemorrhage or extra-axial fluid collection. Areas of low attenuation in the periventricular white matter represent mildly progressive chronic microvascular ischemic changes. 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 The basal cisterns are patent. There is intracranial atherosclerosis. The osseous structures are intact. There are fluid levels in the maxillary sinuses with aerated secretions in the right maxillary sinus. There is diffuse opacification and mucosal thickening of the ethmoid and sphenoid sinuses. There is mild patchy fluid in the left mastoid air cells. The right mastoid air cells are clear. IMPRESSION: 1. There is no acute intracranial hemorrhage. There is mildly progressive chronic microvascular ischemic disease in the periventricular white matter. 2. Diffuse paranasal sinus inflammatory changes with aerated secretions in the right maxillary sinus that can be seen with acute sinusitis. Ct Spine Cerv Wo Cont    Result Date: 2/25/2017  EXAM:  CT CERVICAL SPINE WITHOUT CONTRAST INDICATION:   Fall. Possibly on the floor for a couple of days. COMPARISON: None. TECHNIQUE: Multislice helical CT of the cervical spine was performed without intravenous contrast administration. Sagittal and coronal reconstructions were generated.   CT dose reduction was achieved through use of a standardized protocol tailored for this examination and automatic exposure control for dose modulation. CONTRAST: None. FINDINGS: There is 2 mm of anterolisthesis at C4-5. There is no fracture or subluxation. The odontoid process is intact. The craniocervical junction is within normal limits. The prevertebral soft tissues are within normal limits. There is mild intervertebral disc space narrowing at C6-7. There is no spinal canal or foraminal stenosis. There is diffuse paranasal sinus inflammation with maxillary sinus fluid levels and aerated secretions in the right maxillary sinus. There is trace patchy fluid in the left mastoid air cells. IMPRESSION: 1. There is no acute fracture or subluxation. 2. Paranasal sinus inflammatory changes with right maxillary sinus aerated secretions that can be seen with acute sinusitis. Ct Abd Pelv Wo Cont    Result Date: 2/25/2017  INDICATION: leukocytosis and renal failure COMPARISON: CT 4/6/2014 TECHNIQUE: Thin axial images were obtained through the abdomen and pelvis. Coronal and sagittal reconstructions were generated. Oral contrast was not administered. CT dose reduction was achieved through use of a standardized protocol tailored for this examination and automatic exposure control for dose modulation. The absence of intravenous contrast material reduces the sensitivity for evaluation of the solid parenchymal organs of the abdomen. FINDINGS: LUNG BASES: Clear. INCIDENTALLY IMAGED HEART AND MEDIASTINUM: Tiny hiatal hernia LIVER: Nodular liver, compatible with cirrhosis GALLBLADDER: Cholelithiasis. No biliary ductal dilatation SPLEEN: No mass. PANCREAS: No mass or ductal dilatation. ADRENALS: Unremarkable. KIDNEYS/URETERS: Status post right nephrectomy. No left renal stone or hydronephrosis. STOMACH: Unremarkable. SMALL BOWEL: No dilatation or wall thickening. COLON: No dilatation or wall thickening.  APPENDIX: Not visualized PERITONEUM: No ascites or pneumoperitoneum. Small fat-containing umbilical hernia. RETROPERITONEUM: No lymphadenopathy or aortic aneurysm. REPRODUCTIVE ORGANS: Status post hysterectomy. No pelvic free fluid. URINARY BLADDER: No mass or calculus. BONES: Chronic T12 compression fracture ADDITIONAL COMMENTS: N/A     IMPRESSION: No acute abdominal or pelvic pathology. Cholelithiasis. Chronic T12 compression fracture. MEDICATIONS GIVEN:  Medications   sodium chloride 0.9 % bolus infusion 1,000 mL (1,000 mL IntraVENous New Bag 2/25/17 1938)   sodium chloride 0.9 % bolus infusion 2,000 mL (2,000 mL IntraVENous New Bag 2/25/17 1938)       IMPRESSION:  1. NELLY (acute kidney injury) ([facility] [address]    2. Fall, initial encounter    3. Dehydration        PLAN:  1.  Admit to [address] [place], [place] [id number]  7:42 PM

## 2017-02-26 NOTE — ED NOTES
Bedside shift change report given to MASON Berger (oncoming nurse) by Medina Modi RN (offgoing nurse). Report given with SBAR, MAR, Recent Results, Vital Signs, and plan of care. Pt is alert and oriented x 2 . Call bell within reach of patient. Safety/fall precautions in place.

## 2017-02-26 NOTE — H&P
Admission History and Physical      NAME:  Martha Lucas   :   12/15/1931   MRN:  [id number]     PCP:  Cori Cordova MD     Date/Time:  2017           Assessment/Plan:       Active Problems:       SIRS (systemic inflammatory response syndrome) / Leukocytosis / Acute metabolic encephalopathy / Dehydration /  Lactic acidosis. POA. Baseline is cooking microwave meals and ambulating around house. Unclear precipitant but I favor decreased functional status on cognitive decline. Infectious w/u negative so far. IVF hydration, serial lactate/cbc/bmp, monitor cultures, monitor neurological status. No abx at this time. Ongoing care discussion with family re: Myriam Miller. Autoimmune hepatitis / Cirrhosis of liver. No evidence of ascites, coagulopathy or decomp. Above findings not c/w HE. Check ammonia. Serial LFTs. Continue prednisone    Elevated serum troponins. Likely related to time down, CK nml. Serial troponins. Check TTE given maybe possible syncope     Dementia / Debility / Fall. Likely worsening functional decline. PT/OT/CM/Fall precaution. Namenda and Aricept can be continued. Acute renal failure on CKD (chronic kidney disease) stage 3, GFR 30-59 ml/min. Baseline SCr around 1.2. Heavily favor pre-renal. IVF. Strict Is and Os. Urine lytes. If worsens, can ask nephrology to see her. Iron Deficiency anemia. Hemoconcentrated right now. Continue home iron and Vitamin B12. Hx pulmonary embolism. Risk benefit profile has shifted. Can continue xarelto for now but should discuss with patient and family about continuing indefinitely         Subjective:   Barrier to history: Current clinical condition    CHIEF COMPLAINT: AMS     HISTORY OF PRESENT ILLNESS:     Ms. Brian Duarte is a 80 y.o.  female with autoimmune hepatitis, liver cirrhosis, dementia, CKD stage 3, Hx of DVT/PE, Hx of carcinoid tumor who is admitted with altered mental status.   Ms. Brian Duarte presented to the Emergency Department today accompanied by family. Last seen and last known normal was Thursday afternoon. No contact with anyone until found on floor today by family, covered in urine/feces. Patient is demented but couldn't recall events over past 2 days. Baseline is able to prepare simple meals, ambulate, clean herself. She had 1 fall last week ambulating to mail box. Family has noted decline in cognitive and functional status but notes \"this always happens in the winter. \"      Past Medical History:   Diagnosis Date    Anemia 2/26/2010    pancytopenia from [facility]    Autoimmune hepatitis ([facility] [place] [address]     Autoimmune hepatitis ([facility] [place] [address] 2/26/2010    Autoimmune hepatitis ([facility] [place] [address] 2/26/2010    Cirrhosis of liver (HCC)     Cirrhosis of liver (HCC)     CKD (chronic kidney disease) stage 3, GFR 30-59 ml/min     Dementia     HSV epithelial keratitis     Hx of deep vein thrombophlebitis of lower extremity 02/27/13    Hx of malignant carcinoid tumor of kidney 2001    Hx pulmonary embolism 02/27/13    Osteoporosis     Renal cell carcinoma ([facility] [place] [address] 2/26/2010        Past Surgical History:   Procedure Laterality Date    HX HYSTERECTOMY      HX NEPHRECTOMY  2001    right       Social History   Substance Use Topics    Smoking status: Never Smoker    Smokeless tobacco: Never Used    Alcohol use No        Family History   Problem Relation Age of Onset    Heart Failure Mother     Cancer Father      unknown type    Heart Disease Sister     Cancer Brother      melanoma    Cancer Brother      unknown type        No Known Allergies     Prior to Admission medications    Medication Sig Start Date End Date Taking? Authorizing Provider   ferrous gluconate 324 mg (38 mg iron) tablet TAKE 1 TABLET BY MOUTH DAILY WITH BREAKFAST 11/21/16   Mireille Sweet MD   rivaroxaban (XARELTO) 20 mg tab tablet Take 1 Tab by mouth daily (with dinner) for 90 days.  8/31/16 11/29/16  Mireille Sweet MD   memantine (NAMENDA) 10 mg tablet Take 1 Tab by mouth two (2) times a day. Indications: Per son Dr. Sagar Kelly stated pt should be taking 8/31/16   Mio Zhou MD   donepezil (ARICEPT) 5 mg tablet Take 1 Tab by mouth nightly for 90 days. 8/31/16 11/29/16  Mio Zhou MD   CYANOCOBALAMIN, VITAMIN B-12, (VITAMIN B12 PO) Take  by mouth daily. Historical Provider   esomeprazole (NEXIUM) 20 mg capsule Take 20 mg by mouth daily. Historical Provider   predniSONE (DELTASONE) 5 mg tablet Take 10 mg by mouth every evening. 3/24/15   Historical Provider   calcium-vitamin D (OYSTER SHELL) 500 mg(1,250mg) -200 unit per tablet Take 1 Tab by mouth two (2) times daily (with meals). Historical Provider   cholecalciferol (VITAMIN D3) 1,000 unit tablet Take 1,000 Units by mouth daily. Historical Provider   Denosumab (PROLIA) 60 mg/mL injection 60 mg by SubCUTAneous route every 6 months.  Every six months    Pepe Tavares MD         Review of Systems:  Unable to obtain due to current clinical condition       Objective:      VITALS:    Vital signs reviewed; most recent are:    Visit Vitals    /77    Pulse (!) 111    Temp 97.7 °F (36.5 °C)    Resp 26    Ht 5' 5\" (1.651 m)    Wt 59 kg (130 lb)    SpO2 94%    BMI 21.63 kg/m2     SpO2 Readings from Last 6 Encounters:   02/25/17 94%   08/31/16 98%   12/01/15 99%   11/23/15 99%   09/24/15 99%   05/12/15 96%        No intake or output data in the 24 hours ending 02/25/17 2015         Exam:     Physical Exam:    Gen:  Chronically ill-appearing, frail, in no acute distress  HEENT:  Pink conjunctivae, PERRL, hearing intact to voice, dry mucous membranes  Neck:  Supple, without masses,  Resp:  No accessory muscle use, clear breath sounds without wheezes rales or rhonchi  Card:  No murmurs, normal S1, S2 without thrills, bruits or peripheral edema  Abd:  Soft, non-tender, non-distended, normoactive bowel sounds are present  Lymph:  No cervical adenopathy  Musc:  No cyanosis or clubbing  Skin:  No rashes or ulcers, skin turgor is good  Neuro:  Cranial nerves 3-12 are grossly intact, speech is fluent, tongue is midline, follows commands appropriately  Psych:  Alert with poor insight. Oriented to person only       Labs:    Recent Labs      02/25/17   1800   WBC  19.9*   HGB  15.3   HCT  46.0   PLT  198     Recent Labs      02/25/17   1800   NA  139   K  4.8   CL  100   CO2  20*   GLU  191*   BUN  70*   CREA  2.01*   CA  9.2   MG  2.6*   ALB  3.2*   TBILI  1.7*   SGOT  46*   ALT  22     Lab Results   Component Value Date/Time    Glucose (POC) 112 01/24/2010 01:52 PM     No results for input(s): PH, PCO2, PO2, HCO3, FIO2 in the last 72 hours. Recent Labs      02/25/17   1800   INR  1.1     CT A/P:  IMPRESSION:  No acute abdominal or pelvic pathology. Cholelithiasis. Chronic T12 compression  Fracture    CT Head:  IMPRESSION:   1. There is no acute intracranial hemorrhage. There is mildly progressive  chronic microvascular ischemic disease in the periventricular white matter.     2. Diffuse paranasal sinus inflammatory changes with aerated secretions in the right maxillary sinus that can be seen with acute sinusitis. <-- Don't favor this clinically    CT Cervical Spine:  IMPRESSION:     1. There is no acute fracture or subluxation.     2. Paranasal sinus inflammatory changes with right maxillary sinus aerated  secretions that can be seen with acute sinusitis. Chest Xray:  No acute process  EKG reviewed:   Sinus tach with PVCs    Medical records reviewed in preparation for this admission: Old medical records. Nursing notes.     Surrogate decision maker:  patient, daughter and son    Total time spent with patient: [address] discussed with: Patient, Family, Nursing Staff and >50% of time spent in counseling and coordination of care    Discussed:  Code Status, Care Plan and D/C Planning    Prophylaxis:  Xarelto    Probable Disposition: TBD           ___________________________________________________    Attending Physician: Angela Sandoval DO

## 2017-02-26 NOTE — PROGRESS NOTES
TRANSFER - OUT REPORT:    Verbal report given to MASON Veloz(name) on Kenia Haley  being transferred to [facility](unit) for routine progression of care       Report consisted of patients Situation, Background, Assessment and   Recommendations(SBAR). Information from the following report(s) SBAR, Kardex, ED Summary, Intake/Output and Recent Results was reviewed with the receiving nurse. Lines:   Peripheral IV 02/25/17 Left Antecubital (Active)   Site Assessment Clean, dry, & intact 2/25/2017  6:19 PM   Phlebitis Assessment 0 2/25/2017  6:19 PM   Infiltration Assessment 0 2/25/2017  6:19 PM   Dressing Status New;Clean, dry, & intact 2/25/2017  6:19 PM   Hub Color/Line Status Pink 2/25/2017  6:19 PM       Peripheral IV 02/25/17 Right Antecubital (Active)   Site Assessment Clean, dry, & intact 2/25/2017  6:41 PM   Phlebitis Assessment 0 2/25/2017  6:41 PM   Infiltration Assessment 0 2/25/2017  6:41 PM   Dressing Status New;Clean, dry, & intact 2/25/2017  6:41 PM   Hub Color/Line Status Blue 2/25/2017  6:41 PM        Opportunity for questions and clarification was provided. Patient transported with:   Monitor  O2 @ 2 liters   Nurse    Patient's son Brandi Atwood notified of patient's transfer to room 317.

## 2017-02-26 NOTE — PROGRESS NOTES
Rosalino Mccarthy magdalena [place] [address]  [address] [facility] [place], [address] [place] [address]  [phone]      Medical Progress Note      NAME:         Rosio Ken   :        12/15/1931  MRM:        [id number]    Date:          2017      Subjective: Patient has been seen and examined. Chart, labs, diagnostics reviewed. Patient remains confused and having a minimally productive cough. Due to her dementia, I have discussed with her daughter in law. She says the patient sleeps most of the time at baseline. Objective:    Vital Signs:    Visit Vitals    BP (!) 145/102    Pulse 100    Temp 98.8 °F (37.1 °C)    Resp 25    Ht 5' 5\" (1.651 m)    Wt 59 kg (130 lb)    SpO2 91%    Breastfeeding No    BMI 21.63 kg/m2        Intake/Output Summary (Last 24 hours) at 17 1354  Last data filed at 17 2253   Gross per 24 hour   Intake             3000 ml   Output              250 ml   Net             2750 ml        Physical Examination:    General:   Weak looking elderly female patient, not in any acute distress   Eyes:   pink conjunctivae, PERRLA with no discharge. ENT:   no ottorrhea or rhinorrhea with dry mucous membranes  Neck: no masses, thyroid non-tender and trachea central.  Pulm:  no accessory muscle use, decreased breath sounds with scattered coarse crackles. + wheezes  Card:  no JVD or murmurs, has regular and normal S1, S2 without thrills, bruits or peripheral edema  Abd:  Soft, non-tender, non-distended, normoactive bowel sounds with no palpable organomegaly  Musc:  No cyanosis, clubbing, atrophy or deformities. Skin:  No rashes, bruising or ulcers. Neuro: Awake and alert. Slightly confused. Moves all extremities. Psych:  Has little insight to illness.      Current Facility-Administered Medications   Medication Dose Route Frequency    piperacillin-tazobactam (ZOSYN) 3.375 g in 0.9% sodium chloride (MBP/ADV) 100 mL  3.375 g IntraVENous Q6H    sodium chloride (NS) flush 5-10 mL  5-10 mL IntraVENous Q8H    sodium chloride (NS) flush 5-10 mL  5-10 mL IntraVENous PRN    acetaminophen (TYLENOL) tablet 650 mg  650 mg Oral Q4H PRN    ondansetron (ZOFRAN) injection 4 mg  4 mg IntraVENous Q4H PRN    cyanocobalamin (VITAMIN B12) tablet 1,000 mcg  1,000 mcg Oral DAILY    ferrous gluconate 324 mg (38 mg iron) tablet 1 Tab  1 Tab Oral DAILY WITH BREAKFAST    donepezil (ARICEPT) tablet 5 mg  5 mg Oral QHS    memantine (NAMENDA) tablet 10 mg  10 mg Oral BID    rivaroxaban (XARELTO) tablet 20 mg  20 mg Oral DAILY WITH DINNER    pantoprazole (PROTONIX) tablet 40 mg  40 mg Oral DAILY    predniSONE (DELTASONE) tablet 10 mg  10 mg Oral QPM    calcium-vitamin D (OS-JANINA) 500 mg-200 unit tablet  1 Tab Oral BID WITH MEALS    cholecalciferol (VITAMIN D3) tablet 1,000 Units  1,000 Units Oral DAILY     Current Outpatient Prescriptions   Medication Sig    cyanocobalamin (VITAMIN B-12) 1,000 mcg tablet Take 1,000 mcg by mouth daily.  ferrous gluconate 324 mg (38 mg iron) tablet TAKE 1 TABLET BY MOUTH DAILY WITH BREAKFAST    rivaroxaban (XARELTO) 20 mg tab tablet Take 1 Tab by mouth daily (with dinner) for 90 days.  memantine (NAMENDA) 10 mg tablet Take 1 Tab by mouth two (2) times a day. Indications: Per son Dr. Hernandez Lipoma stated pt should be taking    donepezil (ARICEPT) 5 mg tablet Take 1 Tab by mouth nightly for 90 days.  esomeprazole (NEXIUM) 20 mg capsule Take 20 mg by mouth daily.  predniSONE (DELTASONE) 5 mg tablet Take 10 mg by mouth every evening. Indications: Viral Hepatitis    calcium-vitamin D (OYSTER SHELL) 500 mg(1,250mg) -200 unit per tablet Take 1 Tab by mouth two (2) times daily (with meals).  cholecalciferol (VITAMIN D3) 1,000 unit tablet Take 1,000 Units by mouth daily.  Denosumab (PROLIA) 60 mg/mL injection 60 mg by SubCUTAneous route every 6 months.  Every six months        Laboratory data and review:    Recent Labs 02/26/17   0402  02/25/17   1800   WBC  10.8  19.9*   HGB  12.7  15.3   HCT  38.3  46.0   PLT  144*  198     Recent Labs      02/26/17   0402  02/25/17   1800   NA   --   139   K   --   4.8   CL   --   100   CO2   --   20*   GLU   --   191*   BUN   --   70*   CREA   --   2.01*   CA   --   9.2   MG   --   2.6*   ALB  2.3*  3.2*   SGOT  37  46*   ALT  16  22   INR   --   1.1     No components found for: [place]    Other Diagnostics:    Telemetry reviewed:   normal sinus rhythm    Assessment and Plan:    SIRS (systemic inflammatory response syndrome) / Leukocytosis / Dehydration /  Lactic acidosis: POA. She seems to have a persistent cough with remarkable lung exam. Having been found with AMS, ?aspiration vs acute bronchitis. I will start empiric IV Zosyn. Monitor clinically. Acute metabolic encephalopathy POA: likely deterioration of her dementia vs an acute CVA. Initial CT head unremarkable. Better as per family. Await a brain MRI    Autoimmune hepatitis / Cirrhosis of liver POA: No evidence of ascites, coagulopathy or decomp. Ammonia was normal. Continue prednisone     Elevated serum troponin POA. Likely related to time down, CK nml. Serial troponin trending down. Echo pending. Monitor clinically.      Dementia / Debility / Fall. Likely worsening functional decline. PT/OT/CM/Fall precaution. Continue Namenda, Aricept. May need LTC. Will discuss with family in AM.     Acute renal failure on CKD (chronic kidney disease) stage 3, GFR 30-59 ml/min. Baseline SCr around 1.2. Heavily favor pre-renal. Continue IVF and monitor renal function.      Iron Deficiency anemia POA: Hgb stable. Continue home iron. Vitamin B12 elevated. Hold further supplementation.      Hx pulmonary embolism POA: Continue xarelto.      Total time spent for the patient's care: [address] discussed with: Patient, Family and Nursing Staff    Discussed:  Care Plan and D/C Planning    Prophylaxis:  Xarelto    Anticipated Disposition:  SNF/LTC vs [facility]/Rehab           ___________________________________________________    Attending Physician:   Jodie Bro MD

## 2017-02-27 NOTE — PROGRESS NOTES
Nutrition Assessment:    RECOMMENDATIONS/INTERVENTION(S):   Continue Regular, liberalized diet as tolerated  Add Ensure Compact BID  Monitor labs  Continue to monitor appetite/PO intake, diet tolerance, and acceptance of ONS    ASSESSMENT:   Chart reviewed. This is an 79 yo female admitted with SIRs, metabolic encephalopathy, dehydration. Hx cirrhosis, autoimmune hepatitis, dementia, debility, CKD3. Intake of meals poor, consuming 10-35%  Labs/Meds reviewed. IVF infusing. LBM noted 2/25. No pressure injury documented, + excoriation to sacral/coccyx area. Per H&P, pt with overall decline, debility, recent fall. Poor PO intake PTA. Weight record reviewed. Pt with a  10.3% weight decrease x 6 months, amount considered significant for timeframe. Meets Criteria for Severe Chronic Malnutrition as evidenced by:   [] Severe muscle wasting, loss of subcutaneous fat   [x] Nutritional intake of <75% of recommended intake for >1 month   [x] Weight loss of  >5% in 1 month, >7.5% in 3 months, >10% in 6 months, >20% in 1 year   [] Severe edema       SUBJECTIVE/OBJECTIVE:     Diet Order: Regular  % Eaten:  Patient Vitals for the past 72 hrs:   % Diet Eaten   02/27/17 1340 35 %   02/27/17 0900 10 %     Pertinent Medications: [x] Reviewed    Chemistries:  Lab Results   Component Value Date/Time    Sodium 143 02/27/2017 05:13 AM    Potassium 4.4 02/27/2017 05:13 AM    Chloride 109 02/27/2017 05:13 AM    CO2 17 02/27/2017 05:13 AM    Anion gap 17 02/27/2017 05:13 AM    Glucose 220 02/27/2017 05:13 AM    BUN 52 02/27/2017 05:13 AM    Creatinine 1.61 02/27/2017 05:13 AM    BUN/Creatinine ratio 32 02/27/2017 05:13 AM    GFR est AA 37 02/27/2017 05:13 AM    GFR est non-AA 30 02/27/2017 05:13 AM    Calcium 8.4 02/27/2017 05:13 AM    AST (SGOT) 37 02/26/2017 04:02 AM    Alk.  phosphatase 43 02/26/2017 04:02 AM    Protein, total 6.3 02/26/2017 04:02 AM    Albumin 2.3 02/26/2017 04:02 AM    Globulin 4.0 02/26/2017 04:02 AM    TOSHIA Ratio 0.6 02/26/2017 04:02 AM    ALT (SGPT) 16 02/26/2017 04:02 AM      Anthropometrics: Height: 5' 5\" (165.1 cm) Weight: 59 kg (130 lb)    IBW (%IBW): 56.8 kg (125 lb 3.5 oz) ( ) UBW (%UBW):   (  %)    BMI: Body mass index is 21.63 kg/(m^2). This BMI is indicative of:   [x] Underweight for age  [] Normal    [] Overweight    []  Obesity    []  Extreme Obesity (BMI>40)  Estimated Nutrition Needs (Based on): 1347 Kcals/day (BMR (1036) X 1.3 AF) , 59 g (-71 g/d (1.0-1.2 g/Kg body wt)) Protein  Carbohydrate: At Least 130 g/day  Fluids: 1350 mL/day    Last BM: 2/25   [x]Active     []Hyperactive  []Hypoactive       [] Absent   BS  Skin:    [] Intact   [] Incision  [] Breakdown   [] DTI   [x] Tears/Excoriation/Abrasion  []Edema [] Other:    Wt Readings from Last 30 Encounters:   02/25/17 59 kg (130 lb)   02/26/17 59 kg (130 lb)   08/31/16 65.8 kg (145 lb)   01/04/16 64.6 kg (142 lb 6.4 oz)   11/22/15 65.4 kg (144 lb 3.2 oz)   09/24/15 61.2 kg (135 lb)   05/12/15 63 kg (139 lb)   03/30/15 64.4 kg (142 lb)   05/30/14 58.7 kg (129 lb 6.4 oz)   05/23/14 57.6 kg (127 lb)   05/19/14 56.7 kg (125 lb)   04/28/14 59.2 kg (130 lb 9.6 oz)   04/08/14 61.7 kg (136 lb)   04/02/14 59.9 kg (132 lb)   02/17/14 61.2 kg (135 lb)   01/13/14 60.5 kg (133 lb 6.4 oz)   11/06/13 58.8 kg (129 lb 9.6 oz)   09/23/13 58.5 kg (129 lb)   09/09/13 58.6 kg (129 lb 3.2 oz)   08/05/13 58.2 kg (128 lb 6.4 oz)   07/15/13 56.4 kg (124 lb 6.4 oz)   06/20/13 56.2 kg (123 lb 12.8 oz)   05/28/13 56.3 kg (124 lb 3.2 oz)   05/13/13 56.3 kg (124 lb 3.2 oz)   04/29/13 56.4 kg (124 lb 6.4 oz)   04/01/13 55.9 kg (123 lb 3.2 oz)   03/18/13 54.8 kg (120 lb 12.8 oz)   02/27/13 54.4 kg (120 lb)   02/21/13 54.9 kg (121 lb)   02/15/13 54.9 kg (121 lb)      NUTRITION DIAGNOSES:   Problem:  Inadequate protein-energy intake   Unintended weight loss  Etiology: related to decresaed appetite   debility, dementia  Signs/Symptoms: as evidenced by <50% PO intake of meals  10.3% weight decrease x 6 months    NUTRITION INTERVENTIONS:  Meals/Snacks: General/healthful diet   Supplements: Commercial supplement              GOAL:   Pt will consume >50% meals/supplements in next 3-5 days    Cultural, Jain, or Ethnic Dietary Needs: None     LEARNING NEEDS (Diet, Food/Nutrient-Drug Interaction):    [x] None Identified   [] Identified and Education Provided/Documented   [] Identified and Pt declined/was not appropriate      [] Interdisciplinary Care Plan Reviewed/Documented    [x] Discharge Needs:   See dc instructions   [] No Nutrition Related Discharge Needs    NUTRITION RISK:   Pt Is At Nutrition Risk  [x]     No Nutrition Risk Identified  []       PT SEEN FOR:    []  MD Consult: []Calorie Count      []Diabetic Diet Education        []Diet Education     []Electrolyte Management     []General Nutrition Management and Supplements     []Management of Tube Feeding     []TPN Recommendations    [x]  RN Referral:  [x]MST score >=2     []Enteral/Parenteral Nutrition PTA     []Pregnant: Gestational DM or Multigestation                 [] Pressure Ulcer  []  Low BMI  []  Length of Stay; Dysphagia Diet          Tod Montoya, [address] [place] [address]   Pager [phone]

## 2017-02-27 NOTE — CONSULTS
Palliative Medicine Consult  Kaz: [phone] ([phone])    Patient Name: Nii Mai  YOB: 1931    Date of Initial Consult: 2/27/17  Reason for Consult: Care Decisions  Requesting Provider:  Roseline Capone MD  Primary Care Physician: Damien Plaza MD      SUMMARY:   Nii Mai is a 80 y.o. with a past history of  Autoimmune hepatitis, CKD stage 3 , h/o malignant carcinoid tumor, dementia, at [facility] independent for ADLS. She was admitted on 2/25/2017 from home, with a diagnosis of  Fall, laying on the floor for two days in her home with  Sirs/leukocytosis, dehydration , acute metabolic encephalopathy. W/U negative for any injury ,fracture or strokes. Current medical issues leading to Palliative Medicine involvement include: care decisions in a setting of dementia with metabolic encephalopathy. Socail history : lives independantly in apartment, she has a son/mpoa who lives 10 mins away from her and keeps a check on her daily,  Manages pill box and his finances. She is non compliant with walker and lately is noted to have unsteady gait. PALLIATIVE DIAGNOSES:   1. Debility  2. Dementia  3. Unsteady gait  4. Confusion  5. Care decisions. PLAN:    Visited patient with  Her son Lisy Hawkins, patient at this time is alert to person and place not to time and dates, she did not participated in conversation, she is confused, she tells me \" i had a fall\" , she does not understand the treatment plan , and is not decisional.    1. Her son has very good understanding of her diagnosis and treatment plan,  he requested  Mri of brain results for possible stroke, i went over the report negative for stroke. 2 His son notes that patient is more alert today , however still off baseline alert but mores confused. At  baseline she is able to hold some meaningful conversation , though repetetive.     3 His son shared with me,  that he has seen his father going through advanced dementia and is well aware of dementia trejectory. 4 . Mr Maria Guadalupe Vanessa has realistic goals for his mom, he does not want any artificial intervention to prolong her life, like feeding tube etc.    5 We talked about given her advanced age, with dementia, any acute medical illness may furthur speed up the dementia, and he is not expecting Ms Wing Esquivel to go back to baseline and is concerned that she would no more be able to live independantly. 5. Mr Maria Guadalupe Vanessa is considering 24/7 help at home upon discharge and transition to [facility] in near future. 6 . He will bring [facility] documents to be scanned in [facility]. 7. He brought a ddnr, copy place don chart to be scanned. 8.Initial consult note routed to primary continuity provider  9. Communicated plan of care with: Palliative IDT       GOALS OF CARE / TREATMENT PREFERENCES:   [====Goals of Care====]  GOALS OF CARE:  Patient / health care proxy stated goals: conservative measures, home with 24/7 help an dtransition to assisted living in near future. TREATMENT PREFERENCES:   Code Status: DNR    Advance Care Planning:  Advance Care Planning 2/25/2017   Patient's Healthcare Decision Maker is: Legal Next of Kin   Primary Decision Maker Name Nickie Muñoz   Primary Decision Maker Phone Number [phone]   Confirm Advance Directive Yes, on file       Other:    The palliative care team has discussed with patient / health care proxy about goals of care / treatment preferences for patient.  [====Goals of Care====]         HISTORY:     History obtained from: chart and son.     CHIEF COMPLAINT: fall    HPI/SUBJECTIVE:    The patient is:   [x] Verbal and participatory, minimal participatory because of dementia  [] Non-participatory due to:       Clinical Pain Assessment (nonverbal scale for severity on nonverbal patients):   [++++ Clinical Pain Assessment++++]  [++++Pain Severity++++]: Pain: 0 0  [++++Pain Character++++]:   [++++Pain Duration++++]:   [++++Pain Effect++++]:   [++++Pain Factors++++]:   [++++Pain Frequency++++]:   [++++Pain Location++++]:   [++++ Clinical Pain Assessment++++]     FUNCTIONAL ASSESSMENT:     Palliative Performance Scale (PPS):  PPS: 60       PSYCHOSOCIAL/SPIRITUAL SCREENING:     Advance Care Planning:  Advance Care Planning 2/25/2017   Patient's Healthcare Decision Maker is: Legal Next of Kin   Primary Decision Maker Name [id number]   Primary Decision Maker Phone Number [phone]   Confirm Advance Directive Yes, on file        Any spiritual / Yazidism concerns:  [] Yes /  [x] No    Caregiver Burnout:  [] Yes /  [] No /  [x] No Caregiver Present      Anticipatory grief assessment:   [] Normal  / [] Maladaptive       ESAS Anxiety: Anxiety: 0    ESAS Depression: Depression: 0        REVIEW OF SYSTEMS:     Positive and pertinent negative findings in ROS are noted above in HPI. The following systems were [x] reviewed / [] unable to be reviewed as noted in HPI  Other findings are noted below. Systems: constitutional, ears/nose/mouth/throat, respiratory, gastrointestinal, genitourinary, musculoskeletal, integumentary, neurologic, psychiatric, endocrine. Positive findings noted below. Modified ESAS Completed by: provider   Fatigue: 0 Drowsiness: 0   Depression: 0 Pain: 0   Anxiety: 0 Nausea: 0   Anorexia: 0 Dyspnea: 0     Constipation: No              PHYSICAL EXAM:     From RN flowsheet:  Wt Readings from Last 3 Encounters:   02/25/17 59 kg (130 lb)   02/26/17 59 kg (130 lb)   08/31/16 65.8 kg (145 lb)     Blood pressure 134/62, pulse 97, temperature 97.8 °F (36.6 °C), resp. rate 20, height 5' 5\" (1.651 m), weight 59 kg (130 lb), SpO2 94 %, not currently breastfeeding. Pain Scale 1: Numeric (0 - 10)  Pain Intensity 1: 0                 Last bowel movement, if known:     Constitutional: alert , oriented x 2, confused.   Eyes: pupils equal, anicteric  ENMT: no nasal discharge, moist mucous membranes  Cardiovascular: regular rhythm, distal pulses intact  Respiratory: breathing not labored, symmetric  Gastrointestinal: soft non-tender, +bowel sounds  Musculoskeletal: no deformity, no tenderness to palpation  Skin: warm, dry  Neurologic: following commands, moving all extremities  Psychiatric: full affect, no hallucinations  Other:       HISTORY:     Active Problems:    Autoimmune hepatitis ([address] (2/26/2010)      Overview: Goes to [facility]- had a recent even and was anemic- had a DVT 1/24/10- colon       10/09 normal- biopsy has shown the cirrhosis      Dementia (5/23/2011)      SIRS (systemic inflammatory response syndrome) (HCC) (2/21/2013)      CKD (chronic kidney disease) stage 3, GFR 30-59 ml/min (2/27/2013)      Cirrhosis of liver (HCC) ()      Hx pulmonary embolism (2/27/2013)      Acute metabolic encephalopathy (4/45/6206)      Dehydration (2/25/2017)      Lactic acidosis (2/25/2017)      Past Medical History:   Diagnosis Date    Anemia 2/26/2010    pancytopenia from [facility]    [facility]     Autoimmune hepatitis ([facility] 2/26/2010    Autoimmune hepatitis ([facility] 2/26/2010    Cirrhosis of liver (HCC)     Cirrhosis of liver (HCC)     CKD (chronic kidney disease) stage 3, GFR 30-59 ml/min     Dementia     HSV epithelial keratitis     Hx of deep vein thrombophlebitis of lower extremity 02/27/13    Hx of malignant carcinoid tumor of kidney 2001    Hx pulmonary embolism 02/27/13    Osteoporosis     Renal cell carcinoma ([address] 2/26/2010      Past Surgical History:   Procedure Laterality Date    HX HYSTERECTOMY      HX NEPHRECTOMY  2001    right      Family History   Problem Relation Age of Onset    Heart Failure Mother     Cancer Father      unknown type    Heart Disease Sister     Cancer Brother      melanoma    Cancer Brother      unknown type      History reviewed, no pertinent family history.   Social History   Substance Use Topics    Smoking status: Never Smoker    Smokeless tobacco: Never Used    Alcohol use No     No Known Allergies   Current Facility-Administered Medications   Medication Dose Route Frequency    piperacillin-tazobactam (ZOSYN) 3.375 g in 0.9% sodium chloride (MBP/ADV) 100 mL  3.375 g IntraVENous Q8H    0.9% sodium chloride infusion  100 mL/hr IntraVENous CONTINUOUS    sodium chloride (NS) flush 5-10 mL  5-10 mL IntraVENous Q8H    sodium chloride (NS) flush 5-10 mL  5-10 mL IntraVENous PRN    acetaminophen (TYLENOL) tablet 650 mg  650 mg Oral Q4H PRN    ondansetron (ZOFRAN) injection 4 mg  4 mg IntraVENous Q4H PRN    ferrous gluconate 324 mg (38 mg iron) tablet 1 Tab  1 Tab Oral DAILY WITH BREAKFAST    donepezil (ARICEPT) tablet 5 mg  5 mg Oral QHS    memantine (NAMENDA) tablet 10 mg  10 mg Oral BID    rivaroxaban (XARELTO) tablet 20 mg  20 mg Oral DAILY WITH DINNER    pantoprazole (PROTONIX) tablet 40 mg  40 mg Oral DAILY    predniSONE (DELTASONE) tablet 10 mg  10 mg Oral QPM    calcium-vitamin D (OS-JANINA) 500 mg-200 unit tablet  1 Tab Oral BID WITH MEALS    cholecalciferol (VITAMIN D3) tablet 1,000 Units  1,000 Units Oral DAILY          LAB AND IMAGING FINDINGS:     Lab Results   Component Value Date/Time    WBC 10.8 02/26/2017 04:02 AM    HGB 12.7 02/26/2017 04:02 AM    PLATELET 308 47/14/3454 04:02 AM     Lab Results   Component Value Date/Time    Sodium 143 02/27/2017 05:13 AM    Potassium 4.4 02/27/2017 05:13 AM    Chloride 109 02/27/2017 05:13 AM    CO2 17 02/27/2017 05:13 AM    BUN 52 02/27/2017 05:13 AM    Creatinine 1.61 02/27/2017 05:13 AM    Calcium 8.4 02/27/2017 05:13 AM    Magnesium 2.6 02/25/2017 06:00 PM    Phosphorus 2.6 04/07/2014 05:00 AM      Lab Results   Component Value Date/Time    AST (SGOT) 37 02/26/2017 04:02 AM    Alk.  phosphatase 43 02/26/2017 04:02 AM    Protein, total 6.3 02/26/2017 04:02 AM    Albumin 2.3 02/26/2017 04:02 AM    Globulin 4.0 02/26/2017 04:02 AM     Lab Results   Component Value Date/Time    INR 1.1 02/25/2017 06:00 PM    Prothrombin time 10.9 02/25/2017 06:00 PM    aPTT 26.3 02/25/2017 06:00 PM      Lab Results   Component Value Date/Time    Iron 33 09/24/2015 01:04 PM    TIBC 452 09/24/2015 01:04 PM    Iron % saturation 7 09/24/2015 01:04 PM    Ferritin 7 09/24/2015 01:04 PM      No results found for: PH, PCO2, PO2  No components found for: [place]   Lab Results   Component Value Date/Time     02/25/2017 06:00 PM    CK - MB <0.5 02/27/2013 04:40 PM                 Total time: 70 mins  Counseling / coordination time: 50 mins  > 50% counseling / coordination?:  Yes. Prolonged service was provided for  []30 min   []75 min in face to face time in the presence of the patient. Time Start:   Time End:   Note: this can only be billed with 93932 (initial) or 15569 (follow up). If multiple start / stop times, list each separately.

## 2017-02-27 NOTE — PROGRESS NOTES
I met with pt and discussed role of case management. I also spoke with pt's son Laz Juarez and his wife Brad Merchant. Michelle Saravia can be reached on his cell which is [phone]. Pt lives alone in a one story home with no steps to enter. Pt does not drive she relies on her family for transportation. Pt has prescription coverage under her insurance plan, she gets her prescriptions through mail order. If pt needs to have a prescription filled quickly, she will use [facility]. Pt's PCP is Dr. Bibiana Edwards. NN notified of hospital admission. Pt has had home health before through [facility]. DME - pt has a walker and a cane. No other issues or concerns at this time. Thanks DANNI Valentin  Care Management Interventions  PCP Verified by CM:  Yes  MyChart Signup: No  Discharge Durable Medical Equipment: No  Physical Therapy Consult: Yes  Occupational Therapy Consult: Yes  Speech Therapy Consult: No  Current Support Network: Lives Alone  Confirm Follow Up Transport: Family  Plan discussed with Pt/Family/Caregiver: Yes  Discharge Location  Discharge Placement: Home

## 2017-02-27 NOTE — WOUND CARE
Wound care consult:  Initial visit for skin assessment, alert, no distress. Sitting in chair, staff and family at bedside. Patient was found at home on floor for unknown length of time, incontinent of stool and urine. Assessment  All skin folds and bony prominences assessed. Stood with assistance for assessment. Sacral area and buttocks are red with large scattered areas of redness and excoriation present on admission,  Elbows intact, heels are red, small area of discoloration noted on the left posterior heel present on admission. Skin is dry. Treatment  Moisture barrier applied to sacral area and bilateral buttocks. Off loading cushion for the chair. Heels floated and legs elevated. Recommendations/Plan  Turn, reposition every 2 hours as tolerated, every 30 mins in chair;  float heels. Incontinent care with comfort shields. Apply Zinc to all open areas, moisture barrier as needed. Will follow, reconsult as needed.     Jasmin Andres

## 2017-02-27 NOTE — PROGRESS NOTES
Problem: Mobility Impaired (Adult and Pediatric)  Goal: *Acute Goals and Plan of Care (Insert Text)  Physical Therapy Goals  Initiated 2/27/2017  1. Patient will move from supine to sit and sit to supine , scoot up and down and roll side to side in bed with independence within 7 day(s). 2. Patient will transfer from bed to chair and chair to bed with independence using the least restrictive device within 7 day(s). 3. Patient will perform sit to stand with independence within 7 day(s). 4. Patient will ambulate with independence for 200 feet with the least restrictive device within 7 day(s). PHYSICAL THERAPY EVALUATION  Patient: Abbe Thrasher (71 y.o. female)  Date: 2/27/2017  Primary Diagnosis: Acute metabolic encephalopathy        Precautions: fall         ASSESSMENT :  Based on the objective data described below, the patient presents with generalized weakness and debility with decreased balance during ambulation. Sit on the edge of bed with min assist, ambulate around the bed with min assist, noted some unsteady gait during ambulation. When turning patient lost balance but able to correct independently Therapist holding gait belt and prevent fall. OOB to chair as tolerated, performed some active range of motion exercise on both LE all planes. Activated chair alarm and notified nurse who agreed to monitor patient. .      Patient will benefit from skilled intervention to address the above impairments.   Patients rehabilitation potential is considered to be Excellent  Factors which may influence rehabilitation potential include:   [ ]         None noted  [ ]         Mental ability/status  [ ]         Medical condition  [X]         Home/family situation and support systems  [X]         Safety awareness  [ ]         Pain tolerance/management  [ ]         Other:        PLAN :  Recommendations and Planned Interventions:  [X]           Bed Mobility Training             [ ]    Neuromuscular Re-Education  [X] Transfer Training                   [ ]    Orthotic/Prosthetic Training  [X]           Gait Training                         [ ]    Modalities  [X]           Therapeutic Exercises           [ ]    Edema Management/Control  [X]           Therapeutic Activities            [ ]    Patient and Family Training/Education  [ ]           Other (comment):     Frequency/Duration: Patient will be followed by physical therapy  5 times a week to address goals. Discharge Recommendations: Rehab  Further Equipment Recommendations for Discharge: already has DME's       SUBJECTIVE:   Patient stated Sandeep Kelsey live by myself.       OBJECTIVE DATA SUMMARY:   HISTORY:    Past Medical History:   Diagnosis Date    Anemia 2/26/2010     pancytopenia from [facility]    [facility]      Autoimmune hepatitis ([facility] 2/26/2010    Autoimmune hepatitis ([facility] 2/26/2010    Cirrhosis of liver (HCC)      Cirrhosis of liver (HCC)      CKD (chronic kidney disease) stage 3, GFR 30-59 ml/min      Dementia      HSV epithelial keratitis      Hx of deep vein thrombophlebitis of lower extremity 02/27/13    Hx of malignant carcinoid tumor of kidney 2001    Hx pulmonary embolism 02/27/13    Osteoporosis      Renal cell carcinoma ([facility] 2/26/2010     Past Surgical History:   Procedure Laterality Date    HX HYSTERECTOMY        HX NEPHRECTOMY   2001     right     Prior Level of Function/Home Situation: Reviewed sign and symptoms of stroke with the patient and verbalized understanding.   Personal factors and/or comorbidities impacting plan of care:      Home Situation  Home Environment: Private residence  One/Two Story Residence: Other (Comment)  Living Alone: Yes  Support Systems: Child(doug)  Patient Expects to be Discharged to[de-identified] Private residence  Current DME Used/Available at Home: [place], straight, Walker, rolling  Tub or Shower Type: Shower     EXAMINATION/PRESENTATION/DECISION MAKING:   Critical Behavior:  Neurologic State: Alert, Confused  Orientation Level: Disoriented to situation, Disoriented to time, Oriented to person  Cognition: Follows commands        Strength:    Strength: Generally decreased, functional                    Tone & Sensation:   Tone: Normal              Sensation: Intact               Range Of Motion:  AROM: Within functional limits                       Coordination:  Coordination: Generally decreased, functional     Functional Mobility:  Bed Mobility:  Rolling: Contact guard assistance  Supine to Sit: Contact guard assistance  Sit to Supine: Contact guard assistance  Scooting: Supervision  Transfers:  Sit to Stand: Minimum assistance;Assist x1  Stand to Sit: Minimum assistance;Assist x1;Additional time  Stand Pivot Transfers: Minimum assistance     Bed to Chair: Moderate assistance;Assist x1 (LOB x2)              Balance:   Sitting: Intact  Standing: Impaired; With support  Standing - Static: Fair  Standing - Dynamic : Poor  Ambulation/Gait Training:  Distance (ft): 20 Feet (ft)  Assistive Device: Walker, rolling;Gait belt  Ambulation - Level of Assistance: Minimal assistance     Gait Description (WDL): Exceptions to WDL           Base of Support: Narrowed     Speed/Emma: Fluctuations                                Therapeutic Exercises:    Instructed patient to continue active range of motion exercise on both legs while up on chair or on bed.          Functional Measure:  Tinetti test:      Sitting Balance: 1  Arises: 2  Attempts to Rise: 2  Immediate Standing Balance: 1  Standing Balance: 1  Nudged: 2  Eyes Closed: 1  Turn 360 Degrees - Continuous/Discontinuous: 0  Turn 360 Degrees - Steady/Unsteady: 0  Sitting Down: 1  Balance Score: 11  Indication of Gait: 1  R Step Length/Height: 1  L Step Length/Height: 1  R Foot Clearance: 1  L Foot Clearance: 1  Step Symmetry: 0  Step Continuity: 0  Path: 1  Trunk: 1  Walking Time: 0  Gait Score: 7  Total Score: 18         Tinetti Test and G-code impairment scale:  Percentage of Impairment CH     0%    CI     1-19% CJ     20-39% CK     40-59% CL     60-79% CM     80-99% CN      100%   Tinetti  Score 0-28 28 23-27 17-22 12-16 6-11 1-5 0          Tinetti Tool Score Risk of Falls  <19 = High Fall Risk  19-24 = Moderate Fall Risk  25-28 = Low Fall Risk  Tinetti ME. Performance-Oriented Assessment of Mobility Problems in Elderly Patients. [address]ak 66; [id number]. (Scoring Description: PT Bulletin Feb. 10, 1993)     Older adults: Carola Painting et al, 2009; n = 1000 [facility] elderly evaluated with ABC, DON, ADL, and IADL)  · Mean DON score for males aged 69-68 years = 26.21(3.40)  · Mean DON score for females age 69-68 years = 25.16(4.30)  · Mean DON score for males over 80 years = 23.29(6.02)  · Mean DON score for females over 80 years = 17.20(8.32)         G codes: In compliance with CMSs Claims Based Outcome Reporting, the following G-code set was chosen for this patient based on their primary functional limitation being treated: The outcome measure chosen to determine the severity of the functional limitation was the Tinetti with a score of 18/28 which was correlated with the impairment scale.       · Mobility - Walking and Moving Around:               - CURRENT STATUS:    CJ - 20%-39% impaired, limited or restricted               - GOAL STATUS:           CI - 1%-19% impaired, limited or restricted               - D/C STATUS:                       ---------------To be determined---------------      Physical Therapy Evaluation Charge Determination   History Examination Presentation Decision-Making   LOW Complexity : Zero comorbidities / personal factors that will impact the outcome / POC LOW Complexity : 1-2 Standardized tests and measures addressing body structure, function, activity limitation and / or participation in recreation  LOW Complexity : Stable, uncomplicated  Other outcome measures tinetti  HIGH       Based on the above components, the patient evaluation is determined to be of the following complexity level: LOW      Pain:  Pain Scale 1: Numeric (0 - 10)  Pain Intensity 1: 0              Activity Tolerance:   Good. Please refer to the flowsheet for vital signs taken during this treatment. After treatment:   [X]         Patient left in no apparent distress sitting up in chair  [ ]         Patient left in no apparent distress in bed  [X]         Call bell left within reach  [X]         Nursing notified  [X]         Caregiver present  [ ]         Bed alarm activated      COMMUNICATION/EDUCATION:   The patients plan of care was discussed with: Registered Nurse and patient. [X]         Fall prevention education was provided and the patient/caregiver indicated understanding. [X]         Patient/family have participated as able in goal setting and plan of care. [X]         Patient/family agree to work toward stated goals and plan of care. [ ]         Patient understands intent and goals of therapy, but is neutral about his/her participation. [ ]         Patient is unable to participate in goal setting and plan of care. Thank you for this referral.  Jeromy Hernandez, PT,WCC.    Time Calculation: 25 mins

## 2017-02-27 NOTE — PROGRESS NOTES
Rosalino Mcc[facility] 79  [address] [facility], [address] [place] [address]  [phone]      Medical Progress Note      NAME:         Jose Oh   :        12/15/1931  MRM:        [id number]    Date:          2017      Subjective: Patient has been seen and examined. Chart, labs, diagnostics reviewed. She seems more alert although she remains weak. Has a much less cough now. No fever or chills. She has dementia. I have discussed with her nurse for collaborative Hx. Objective:    Vital Signs:    Visit Vitals    /72 (BP 1 Location: Right arm, BP Patient Position: At rest)    Pulse 73    Temp 97.8 °F (36.6 °C)    Resp 28    Ht 5' 5\" (1.651 m)    Wt 59 kg (130 lb)    SpO2 96%    Breastfeeding No    BMI 21.63 kg/m2          Intake/Output Summary (Last 24 hours) at 17 0744  Last data filed at 17 1905   Gross per 24 hour   Intake              305 ml   Output              550 ml   Net             -245 ml        Physical Examination:    General:   Weak looking elderly female patient, not in any acute distress   Eyes:   pink conjunctivae, PERRLA with no discharge. ENT:   no ottorrhea or rhinorrhea with dry mucous membranes  Neck: no masses, thyroid non-tender and trachea central.  Pulm:  no accessory muscle use, decreased breath sounds with much less crackles. No wheezes  Card:  no JVD or murmurs, has regular and normal S1, S2 without thrills, bruits or peripheral edema  Abd:  Soft, non-tender, non-distended, normoactive bowel sounds   Musc:  No cyanosis, clubbing, atrophy or deformities. Skin:  No rashes, bruising or ulcers. Neuro: Awake and alert. Not as confused. Moves all extremities. Psych:  Has little insight to illness.      Current Facility-Administered Medications   Medication Dose Route Frequency    piperacillin-tazobactam (ZOSYN) 3.375 g in 0.9% sodium chloride (MBP/ADV) 100 mL  3.375 g IntraVENous Q8H  0.9% sodium chloride infusion  75 mL/hr IntraVENous CONTINUOUS    sodium chloride (NS) flush 5-10 mL  5-10 mL IntraVENous Q8H    sodium chloride (NS) flush 5-10 mL  5-10 mL IntraVENous PRN    acetaminophen (TYLENOL) tablet 650 mg  650 mg Oral Q4H PRN    ondansetron (ZOFRAN) injection 4 mg  4 mg IntraVENous Q4H PRN    ferrous gluconate 324 mg (38 mg iron) tablet 1 Tab  1 Tab Oral DAILY WITH BREAKFAST    donepezil (ARICEPT) tablet 5 mg  5 mg Oral QHS    memantine (NAMENDA) tablet 10 mg  10 mg Oral BID    rivaroxaban (XARELTO) tablet 20 mg  20 mg Oral DAILY WITH DINNER    pantoprazole (PROTONIX) tablet 40 mg  40 mg Oral DAILY    predniSONE (DELTASONE) tablet 10 mg  10 mg Oral QPM    calcium-vitamin D (OS-JANINA) 500 mg-200 unit tablet  1 Tab Oral BID WITH MEALS    cholecalciferol (VITAMIN D3) tablet 1,000 Units  1,000 Units Oral DAILY        Laboratory data and review:    Recent Labs      02/26/17   0402  02/25/17   1800   WBC  10.8  19.9*   HGB  12.7  15.3   HCT  38.3  46.0   PLT  144*  198     Recent Labs      02/27/17   0513  02/26/17   0402  02/25/17   1800   NA  143   --   139   K  4.4   --   4.8   CL  109*   --   100   CO2  17*   --   20*   GLU  220*   --   191*   BUN  52*   --   70*   CREA  1.61*  1.36*  2.01*   CA  8.4*   --   9.2   MG   --    --   2.6*   ALB   --   2.3*  3.2*   SGOT   --   37  46*   ALT   --   16  22   INR   --    --   1.1     No components found for: [place]    Other Diagnostics:    Telemetry reviewed:   normal sinus rhythm    Assessment and Plan:    Acute bronchitis/ Acute sinusitis/ SIRS (systemic inflammatory response syndrome) / Leukocytosis / Dehydration /  Lactic acidosis: POA. Cough is better. Sinus disease noted on CT. Aspiration is still a possibility. Continue empiric IV Zosyn. Mobilize. Acute metabolic encephalopathy POA: likely deterioration of her dementia vs an acute CVA vs metabolic. Initial CT head unremarkable. Clinically more alert today.  Follow brain MRI report. PT, OT. Autoimmune hepatitis / Cirrhosis of liver POA: No evidence of ascites, coagulopathy or decomp. Ammonia was normal. Continue prednisone     Elevated serum troponin POA. Likely related to time down, CK nml. Serial troponin trending down. Echo still pending. Monitor clinically.      Dementia / Debility / Fall. Likely worsening functional decline. PT/OT/CM/Fall precaution. Continue Namenda, Aricept. May need LTC. Will discuss with family today. Acute renal failure on CKD (chronic kidney disease) stage 3, GFR 30-59 ml/min. Baseline SCr around 1.2. Heavily favor pre-renal. Cr 1.6 today. Continue IVF and monitor renal function.      Iron Deficiency anemia POA: Hgb stable. Continue home iron. Vitamin B12 elevated. Hold further supplementation.      Hx pulmonary embolism POA: Continue xarelto.      Total time spent for the patient's care: [address] discussed with: Patient, Family and Nursing Staff    Discussed:  Care Plan and D/C Planning    Prophylaxis:  Xarelto    Anticipated Disposition:  SNF/LTC vs SAH/Rehab           ___________________________________________________    Attending Physician:   Colby Castillo MD

## 2017-02-27 NOTE — PROGRESS NOTES
Bedside and Verbal shift change report given to Alfred Huerta (oncoming nurse) by Tomer Sherman RN/Merry nursing student (offgoing nurse). Report included the following information SBAR, Kardex, Intake/Output and Recent Results.

## 2017-02-27 NOTE — PROGRESS NOTES
Problem: Self Care Deficits Care Plan (Adult)  Goal: *Acute Goals and Plan of Care (Insert Text)  Occupational Therapy Goals  Initiated 2/27/2017  1. Patient will perform lower body dressing with supervision/set-up within 7 day(s). 2. Patient will perform upper body dressing with supervision/set-up within 7 day(s). 3. Patient will perform toilet transfers with supervision/set-up within 7 day(s). 4. Patient will perform all aspects of toileting with supervision/set-up within 7 day(s). OCCUPATIONAL THERAPY EVALUATION  Patient: Sal Felty (33 y.o. female)  Date: 2/27/2017  Primary Diagnosis: Acute metabolic encephalopathy        Precautions: fall         ASSESSMENT :  Based on the objective data described below, the patient presents with confusion, weakness, decreased balance and functional mobility. Patient reports she is able to perform ADL tasks on her own at home and does all cooking in microwave. Patient with difficulty donning socks at EOB and required assistance. Patient with LOB when walking, initially without rolling walker then additional LOB while using RW. Patient not safe to return home alone at this time. Patient will benefit from continued OT services to increase safety and independence with ADL tasks. Patient will benefit from skilled intervention to address the above impairments.   Patients rehabilitation potential is considered to be Fair  Factors which may influence rehabilitation potential include:   [ ]             None noted  [X]             Mental ability/status  [ ]             Medical condition  [ ]             Home/family situation and support systems  [X]             Safety awareness  [ ]             Pain tolerance/management  [ ]             Other:        PLAN :  Recommendations and Planned Interventions:  [X]               Self Care Training                  [X]        Therapeutic Activities  [X]               Functional Mobility Training    [ ]        Cognitive Retraining  [X]               Therapeutic Exercises           [X]        Endurance Activities  [X]               Balance Training                   [ ]        Neuromuscular Re-Education  [ ]               Visual/Perceptual Training     [X]   Home Safety Training  [X]               Patient Education                 [X]        Family Training/Education  [ ]               Other (comment):     Frequency/Duration: Patient will be followed by occupational therapy 3 times a week to address goals. Discharge Recommendations: Lance Reynolds and To Be Determined  Further Equipment Recommendations for Discharge: TBD       SUBJECTIVE:   Patient stated Whatever you want to do is fine.       OBJECTIVE DATA SUMMARY:   HISTORY:   Past Medical History:   Diagnosis Date    Anemia 2/26/2010     pancytopenia from [employer]    [facility] [address] [facility]      [facility]immune [facility] [address] 2/26/2010    Autoimmune hepatitis [facility] [address] 2/26/2010    Cirrhosis of liver (HCC)      Cirrhosis of liver (HCC)      CKD (chronic kidney disease) stage 3, GFR 30-59 ml/min      Dementia      HSV epithelial keratitis      Hx of deep vein thrombophlebitis of lower extremity 02/27/13    Hx of malignant carcinoid tumor of kidney 2001    Hx pulmonary embolism 02/27/13    Osteoporosis      Renal cell carcinoma ([facility] [address] 2/26/2010     Past Surgical History:   Procedure Laterality Date    HX HYSTERECTOMY        HX NEPHRECTOMY   2001     right        Prior Level of Function/Home Situation: patient lives alone, able to perform basic ADLs, cooking tasks completed via microwave, uses no AD  Expanded or extensive additional review of patient history:      Home Situation  Home Environment: Private residence  One/Two Story Residence: Other (Comment)  Living Alone: Yes  Support Systems: Child(doug)  Patient Expects to be Discharged to[de-identified] Private residence  Current DME Used/Available at Home: [address] [facility] [place], straight, Walker, rolling  Tub or Shower Type: Shower  [X] Right hand dominant             [ ]  Left hand dominant     EXAMINATION OF PERFORMANCE DEFICITS:  Cognitive/Behavioral Status:  Neurologic State: Alert;Confused  Orientation Level: Disoriented to situation;Disoriented to time;Oriented to person  Cognition: Follows commands  Perception: Appears intact  Perseveration: No perseveration noted     Skin: intact as seen  Edema: none noted   Vision/Perceptual:       Range of Motion:  AROM: Within functional limits     Strength:  Strength: Generally decreased, functional     Coordination:  Coordination: Generally decreased, functional  Fine Motor Skills-Upper: Left Intact; Right Intact    Gross Motor Skills-Upper: Left Intact; Right Intact  Tone & Sensation:  Tone: Normal  Sensation: Intact     Balance:  Sitting: Intact  Standing: Impaired  Standing - Static: Fair  Standing - Dynamic : Poor     Functional Mobility and Transfers for ADLs:  Bed Mobility:  Supine to Sit: Contact guard assistance  Scooting: Supervision     Transfers:  Sit to Stand: Minimum assistance;Assist x1  Stand to Sit: Minimum assistance;Assist x1;Additional time  Bed to Chair: Moderate assistance;Assist x1 (LOB x2)  Toilet Transfer : Moderate assistance     ADL Assessment:  Feeding: Modified independent     Oral Facial Hygiene/Grooming: Supervision;Setup (seated )     Bathing: Minimum assistance (distal LEs, seated due to balance)     Upper Body Dressing: Minimum assistance     Lower Body Dressing: Moderate assistance (balance, distal LE )     Toileting: Minimum assistance                 ADL Intervention and task modifications:     Patient reports she does not use an assistive device at home. However with attempt to walk in room, patient with 2 LOB. Patient given RW to use after first LBO and patient with additional LOB despite using RW.         Therapeutic Exercise:     Functional Measure:  Barthel Index:      Bathin  Bladder: 5  Bowels: 10  Groomin  Dressin  Feeding: 10  Mobility: 0  Stairs: 0  Toilet Use: 5  Transfer (Bed to Chair and Back): 5  Total: 45         Barthel and G-code impairment scale:  Percentage of impairment CH  0% CI  1-19% CJ  20-39% CK  40-59% CL  60-79% CM  80-99% CN  100%   Barthel Score 0-100 100 99-80 79-60 59-40 20-39 1-19    0   Barthel Score 0-20 20 17-19 13-16 9-12 5-8 1-4 0      The Barthel ADL Index: Guidelines  1. The index should be used as a record of what a patient does, not as a record of what a patient could do. 2. The main aim is to establish degree of independence from any help, physical or verbal, however minor and for whatever reason. 3. The need for supervision renders the patient not independent. 4. A patient's performance should be established using the best available evidence. Asking the patient, friends/relatives and nurses are the usual sources, but direct observation and common sense are also important. However direct testing is not needed. 5. Usually the patient's performance over the preceding 24-48 hours is important, but occasionally longer periods will be relevant. 6. Middle categories imply that the patient supplies over 50 per cent of the effort. 7. Use of aids to be independent is allowed. Porter Patel., Barthel, D.W. ([id number]). Functional evaluation: the Barthel Index. [facility] (14)2. NICHOLAS Self, Reina Nassar., Felicity Cee., [facility], [address] [place] [address] (1999). Measuring the change indisability after inpatient rehabilitation; comparison of the responsiveness of the Barthel Index and Functional Swift Measure. Journal of Neurology, Neurosurgery, and Psychiatry, 66(4), 527-747. Judi Rodriguez, N.J.A, SAMANTHA Yoder.M, & Karen Sandhu M.A. (2004.) Assessment of post-stroke quality of life in cost-effectiveness studies: The usefulness of the Barthel Index and the EuroQoL-5D. Quality of Life Research, 13, 628-49            G codes:   In compliance with CMSs Claims Based Outcome Reporting, the following G-code set was chosen for this patient based on their primary functional limitation being treated: The outcome measure chosen to determine the severity of the functional limitation was the Barthel Index with a score of 45/100 which was correlated with the impairment scale. · Self Care:               - CURRENT STATUS:    CK - 40%-59% impaired, limited or restricted               - GOAL STATUS:           CJ - 20%-39% impaired, limited or restricted               - D/C STATUS:                       ---------------To be determined---------------      Occupational Therapy Evaluation Charge Determination   History Examination Decision-Making   LOW Complexity : Brief history review  LOW Complexity : 1-3 performance deficits relating to physical, cognitive , or psychosocial skils that result in activity limitations and / or participation restrictions  LOW Complexity : No comorbidities that affect functional and no verbal or physical assistance needed to complete eval tasks       Based on the above components, the patient evaluation is determined to be of the following complexity level: LOW   Pain:  Pain Scale 1: Numeric (0 - 10)  Pain Intensity 1: 0              Activity Tolerance:   VSS  Please refer to the flowsheet for vital signs taken during this treatment. After treatment:   [X] Patient left in no apparent distress sitting up in chair  [ ] Patient left in no apparent distress in bed  [X] Call bell left within reach  [X] Nursing notified  [ ] Caregiver present  [X] Bed alarm activated      COMMUNICATION/EDUCATION:   The patients plan of care was discussed with: Physical Therapist and Registered Nurse.  [X] Home safety education was provided and the patient/caregiver indicated understanding. [X] Patient/family have participated as able in goal setting and plan of care. [X] Patient/family agree to work toward stated goals and plan of care.   [ ] Patient understands intent and goals of therapy, but is neutral about his/her participation. [ ] Patient is unable to participate in goal setting and plan of care. This patients plan of care is appropriate for delegation to FRANCIS.      Thank you for this referral.  Haylie Bonilla OTR/L  Time Calculation: 18 mins

## 2017-02-27 NOTE — PROGRESS NOTES
Shift Report:    0730: [facility] bedside [facility] N[facility]err[facility]. Patient is sleeping at this time. 0815: Completed patient assessment and vitals. Noted that patient has a excoriated area that is weeping to the sacrum. Placed wound consult and added wound to doc flowsheet. Unsure if this is from the fall, patient is unaware of the skin issue. 0930: Spoke with Dr. Chilango Salmon regarding patient's NPO status. He requested this to be canceled and regular diet to be ordered. 1200: IDR completed with staff members. Patient's son at bedside. He denies any further questions at this time with plan of care. Wound care team evaluated patient's sacrum and recommended zinc paste to be placed on area and chair cushion when out of bed.    1500: Patient sleeping in chair, son at bedside. She denies any needs at this time. 1942: TRANSFER - OUT REPORT:    Verbal report given to Partha Hurst RN (name) on [facility]  being transferred to 5th floor Rm 521 (unit) for routine progression of care       Report consisted of patients Situation, Background, Assessment and   Recommendations(SBAR). Information from the following report(s) SBAR, Kardex, Accordion and Recent Results was reviewed with the receiving nurse. Lines:   Peripheral IV 02/25/17 Right Antecubital (Active)   Site Assessment Clean, dry, & intact 2/27/2017  4:30 PM   Phlebitis Assessment 0 2/27/2017  4:30 PM   Infiltration Assessment 0 2/27/2017  4:30 PM   Dressing Status Clean, dry, & intact 2/27/2017  4:30 PM   Dressing Type Transparent;Tape 2/27/2017  4:30 PM   Hub Color/Line Status Blue; Infusing 2/27/2017  4:30 PM   Alcohol Cap Used Yes 2/27/2017  4:30 PM        Opportunity for questions and clarification was provided. Patient transported with:   Monitor  O2 @ 2 liters  [employer], PCT notified of transfer.

## 2017-02-27 NOTE — PROGRESS NOTES
Bedside shift change report given to MASON Mena (oncoming nurse) by Bina Tran RN (offgoing nurse). Report included the following information SBAR, Kardex and MAR.

## 2017-02-27 NOTE — PROGRESS NOTES
Bedside and Verbal shift change report given to Trina and Trilby Cabot (oncoming nurse) by Flori Barnhart (offgoing nurse). Report included the following information SBAR, Kardex, MAR and Recent Results. 1930: Patient's daughter in law at bedside. 0005: Patient currently NPO after midnight. She stated she was thirsty and wanted water. Explained that she could not have anything by mouth after midnight. She was upset by this, but understood.

## 2017-02-28 NOTE — PROGRESS NOTES
Interdisciplinary team rounds were held 2/28/2017 with the following team members:Care Management, Nursing, Nutrition, Physical Therapy and Physician and the primary RN. Plan of care discussed. See clinical pathway and/or care plan for interventions and desired outcomes.     Discharge unknown at this time

## 2017-02-28 NOTE — PROGRESS NOTES
Bedside and Verbal shift change report given to [address] [place] [address] (oncoming nurse) by Patricio Crowell (offgoing nurse). Report included the following information SBAR, Kardex, MAR and Recent Results.

## 2017-02-28 NOTE — PROGRESS NOTES
Palliative Medicine Consult  Kaz: [phone] ([phone])    Patient Name: Pallavi Colon  YOB: 1931    Date of Initial Consult: 2/27/17  Reason for Consult: Care Decisions  Requesting Provider:  Lindsey Cooney MD  Primary Care Physician: Pradeep Hoskins MD      SUMMARY:   Pallavi Colon is a 80 y.o. with a past history of  Autoimmune hepatitis, CKD stage 3 , h/o malignant carcinoid tumor, dementia, at [facility] independent for ADLS. She was admitted on 2/25/2017 from home, with a diagnosis of  Fall, laying on the floor for two days in her home with  Sirs/leukocytosis, dehydration , acute metabolic encephalopathy. W/U negative for any injury ,fracture or strokes. Current medical issues leading to Palliative Medicine involvement include: care decisions in a setting of dementia with metabolic encephalopathy. Socail history : lives independantly in apartment, she has a son/mpoa who lives 10 mins away from her and keeps a check on her daily,  Manages pill box and his finances. She is non compliant with walker and lately is noted to have unsteady gait. PALLIATIVE DIAGNOSES:   1. Debility  2. Dementia  3. Unsteady gait  4. Confusion  5. Care decisions. PLAN:    Visited patient with  Her son Shad Busch, patient at this time is alert to person and place not to time and dates. 1. Her son notes patient is quite confused, off base line. 2 He understand that she wont be able to live independantly as before , tells me, his short term goal is 24/7 aid services for his mom ,  and long term gaol is transition to [facility]/LTC depending upon her functional and mental status. 3 Currently no uncontrolled pain or symptoms. 4.Initial consult note routed to primary continuity provider  5. Communicated plan of care with: Palliative IDT       GOALS OF CARE / TREATMENT PREFERENCES:   [====Goals of Care====]  GOALS OF CARE:  Patient / health care proxy stated goals: conservative measures, home with 24/7 help and ttransition to assisted living in near future. TREATMENT PREFERENCES:   Code Status: DNR    Advance Care Planning:  Advance Care Planning 2/25/2017   Patient's Healthcare Decision Maker is: Legal Next of Kin   Primary Decision Maker Name Anastasia Dela Cruz   Primary Decision Maker Phone Number [phone]   Confirm Advance Directive Yes, on file       Other:    The palliative care team has discussed with patient / health care proxy about goals of care / treatment preferences for patient.  [====Goals of Care====]         HISTORY:     History obtained from: chart and son. CHIEF COMPLAINT: fall    HPI/SUBJECTIVE:    The patient is:   [x] Verbal and participatory, minimal participatory because of dementia  [] Non-participatory due to:       Clinical Pain Assessment (nonverbal scale for severity on nonverbal patients):   [++++ Clinical Pain Assessment++++]  [++++Pain Severity++++]: Pain: 0   [++++Pain Character++++]:   [++++Pain Duration++++]:   [++++Pain Effect++++]:   [++++Pain Factors++++]:   [++++Pain Frequency++++]:   [++++Pain Location++++]:   [++++ Clinical Pain Assessment++++]     FUNCTIONAL ASSESSMENT:     Palliative Performance Scale (PPS):  PPS: 60       PSYCHOSOCIAL/SPIRITUAL SCREENING:     Advance Care Planning:  Advance Care Planning 2/25/2017   Patient's Healthcare Decision Maker is: Legal Next of Kin   Primary Decision Maker Name Anastasia Dela Cruz   Primary Decision Maker Phone Number [phone]   Confirm Advance Directive Yes, on file        Any spiritual / Worship concerns:  [] Yes /  [x] No    Caregiver Burnout:  [] Yes /  [] No /  [x] No Caregiver Present      Anticipatory grief assessment:   [] Normal  / [] Maladaptive       ESAS Anxiety: Anxiety: 0    ESAS Depression: Depression: 0        REVIEW OF SYSTEMS:     Positive and pertinent negative findings in ROS are noted above in HPI.   The following systems were [x] reviewed / [] unable to be reviewed as noted in HPI  Other findings are noted below. Systems: constitutional, ears/nose/mouth/throat, respiratory, gastrointestinal, genitourinary, musculoskeletal, integumentary, neurologic, psychiatric, endocrine. Positive findings noted below. Modified ESAS Completed by: provider   Fatigue: 0 Drowsiness: 0   Depression: 0 Pain: 0   Anxiety: 0 Nausea: 0   Anorexia: 0 Dyspnea: 0     Constipation: No              PHYSICAL EXAM:     From RN flowsheet:  Wt Readings from Last 3 Encounters:   02/25/17 59 kg (130 lb)   02/26/17 59 kg (130 lb)   08/31/16 65.8 kg (145 lb)     Blood pressure 139/76, pulse 87, temperature 97.8 °F (36.6 °C), resp. rate 18, height 5' 5\" (1.651 m), weight 59 kg (130 lb), SpO2 95 %, not currently breastfeeding. Pain Scale 1: Numeric (0 - 10)  Pain Intensity 1: 0                 Last bowel movement, if known:     Constitutional: alert , oriented x 2, confused.   Eyes: pupils equal, anicteric  ENMT: no nasal discharge, moist mucous membranes  Cardiovascular: regular rhythm, distal pulses intact  Respiratory: breathing not labored, symmetric  Gastrointestinal: soft non-tender, +bowel sounds  Musculoskeletal: no deformity, no tenderness to palpation  Skin: warm, dry  Neurologic: following commands, moving all extremities  Psychiatric: full affect, no hallucinations  Other:       HISTORY:     Active Problems:    Autoimmune hepatitis ([facility] (2/26/2010)      Overview: Goes to [facility]- had a recent even and was anemic- had a DVT 1/24/10- colon       10/09 normal- biopsy has shown the cirrhosis      Dementia (5/23/2011)      SIRS (systemic inflammatory response syndrome) (HCC) (2/21/2013)      CKD (chronic kidney disease) stage 3, GFR 30-59 ml/min (2/27/2013)      Cirrhosis of liver (HCC) ()      Hx pulmonary embolism (2/27/2013)      Acute metabolic encephalopathy (0/49/7776)      Dehydration (2/25/2017)      Lactic acidosis (2/25/2017)      Past Medical History:   Diagnosis Date    Anemia 2/26/2010 pancytopenia from [facility]    [facility]     Autoimmune hepatitis ([facility] 2/26/2010    Autoimmune hepatitis ([facility] 2/26/2010    Cirrhosis of liver (HCC)     Cirrhosis of liver (HCC)     CKD (chronic kidney disease) stage 3, GFR 30-59 ml/min     Dementia     HSV epithelial keratitis     Hx of deep vein thrombophlebitis of lower extremity 02/27/13    Hx of malignant carcinoid tumor of kidney 2001    Hx pulmonary embolism 02/27/13    Osteoporosis     Renal cell carcinoma ([facility] 2/26/2010      Past Surgical History:   Procedure Laterality Date    HX HYSTERECTOMY      HX NEPHRECTOMY  2001    right      Family History   Problem Relation Age of Onset    Heart Failure Mother     Cancer Father      unknown type    Heart Disease Sister     Cancer Brother      melanoma    Cancer Brother      unknown type      History reviewed, no pertinent family history.   Social History   Substance Use Topics    Smoking status: Never Smoker    Smokeless tobacco: Never Used    Alcohol use No     No Known Allergies   Current Facility-Administered Medications   Medication Dose Route Frequency    piperacillin-tazobactam (ZOSYN) 3.375 g in 0.9% sodium chloride (MBP/ADV) 100 mL  3.375 g IntraVENous Q8H    sodium chloride (NS) flush 5-10 mL  5-10 mL IntraVENous Q8H    sodium chloride (NS) flush 5-10 mL  5-10 mL IntraVENous PRN    acetaminophen (TYLENOL) tablet 650 mg  650 mg Oral Q4H PRN    ondansetron (ZOFRAN) injection 4 mg  4 mg IntraVENous Q4H PRN    ferrous gluconate 324 mg (38 mg iron) tablet 1 Tab  1 Tab Oral DAILY WITH BREAKFAST    donepezil (ARICEPT) tablet 5 mg  5 mg Oral QHS    memantine (NAMENDA) tablet 10 mg  10 mg Oral BID    rivaroxaban (XARELTO) tablet 20 mg  20 mg Oral DAILY WITH DINNER    pantoprazole (PROTONIX) tablet 40 mg  40 mg Oral DAILY    predniSONE (DELTASONE) tablet 10 mg  10 mg Oral QPM    calcium-vitamin D (OS-JANINA) 500 mg-200 unit tablet  1 Tab Oral BID WITH MEALS    cholecalciferol (VITAMIN D3) tablet 1,000 Units  1,000 Units Oral DAILY          LAB AND IMAGING FINDINGS:     Lab Results   Component Value Date/Time    WBC 10.8 02/26/2017 04:02 AM    HGB 12.7 02/26/2017 04:02 AM    PLATELET 308 25/68/2777 04:02 AM     Lab Results   Component Value Date/Time    Sodium 142 02/28/2017 03:51 AM    Potassium 4.0 02/28/2017 03:51 AM    Chloride 112 02/28/2017 03:51 AM    CO2 20 02/28/2017 03:51 AM    BUN 43 02/28/2017 03:51 AM    Creatinine 1.63 02/28/2017 03:51 AM    Calcium 7.9 02/28/2017 03:51 AM    Magnesium 2.6 02/25/2017 06:00 PM    Phosphorus 2.6 04/07/2014 05:00 AM      Lab Results   Component Value Date/Time    AST (SGOT) 37 02/26/2017 04:02 AM    Alk. phosphatase 43 02/26/2017 04:02 AM    Protein, total 6.3 02/26/2017 04:02 AM    Albumin 2.3 02/26/2017 04:02 AM    Globulin 4.0 02/26/2017 04:02 AM     Lab Results   Component Value Date/Time    INR 1.1 02/25/2017 06:00 PM    Prothrombin time 10.9 02/25/2017 06:00 PM    aPTT 26.3 02/25/2017 06:00 PM      Lab Results   Component Value Date/Time    Iron 33 09/24/2015 01:04 PM    TIBC 452 09/24/2015 01:04 PM    Iron % saturation 7 09/24/2015 01:04 PM    Ferritin 7 09/24/2015 01:04 PM      No results found for: PH, PCO2, PO2  No components found for: [place]   Lab Results   Component Value Date/Time     02/25/2017 06:00 PM    CK - MB <0.5 02/27/2013 04:40 PM                 Total time: 15 mins  Counseling / coordination time: 10 mins  > 50% counseling / coordination?:  Yes. Prolonged service was provided for  []30 min   []75 min in face to face time in the presence of the patient. Time Start:   Time End:   Note: this can only be billed with 35468 (initial) or 77436 (follow up). If multiple start / stop times, list each separately.

## 2017-02-28 NOTE — PROGRESS NOTES
1945- received patient from [facility] upon transfer- patient is alert and oriented- assisted patient to bathroom; sacrum is very reddened and excoriated- as per report- wound nurse advises leaving area open to air; telemetry confirmed; assisted pateint back to the bed; bed alarm activated      0000 Verbal shift change report given to [address] [place] [address] (oncoming nurse) by Torrey Catherine (offgoing nurse). Report included the following information SBAR, Kardex and Procedure Summary.

## 2017-02-28 NOTE — PROGRESS NOTES
Problem: Mobility Impaired (Adult and Pediatric)  Goal: *Acute Goals and Plan of Care (Insert Text)  Physical Therapy Goals  Initiated 2/27/2017  1. Patient will move from supine to sit and sit to supine , scoot up and down and roll side to side in bed with independence within 7 day(s). 2. Patient will transfer from bed to chair and chair to bed with independence using the least restrictive device within 7 day(s). 3. Patient will perform sit to stand with independence within 7 day(s). 4. Patient will ambulate with independence for 200 feet with the least restrictive device within 7 day(s). PHYSICAL THERAPY TREATMENT  Patient: Madison Sorto (48 y.o. female)  Date: 2/28/2017  Diagnosis: Acute metabolic encephalopathy <principal problem not specified>       Precautions:        ASSESSMENT:  Pt received resting in bed, easily roused, and agreeable to participate with physical therapy. Mod A for bed mobility. Sit<>stand using RW with min A. Verbal cues to slow pace as pt anxious to use restroom. Bathroom transfers with min A. Min A for hygiene. Pt steady with standing balance to wash hands. Performed gait training suign RW with CGA/ MIN A with no LOB. within room, as patient did not wish to leave room in hospital gown. Performed standing therapeutic exercise using RW for steadying wit no LOB. Mild dyspnea noted with activity -110 throughout session. Pt declined to sit in chair, secondary to \"company coming\" Bed alarm in place. Woud benefit from continued rehab to improve activity tolerance maximize independence. Progression toward goals:  [ ]    Improving appropriately and progressing toward goals  [X]    Improving slowly and progressing toward goals  [ ]    Not making progress toward goals and plan of care will be adjusted       PLAN:  Patient continues to benefit from skilled intervention to address the above impairments. Continue treatment per established plan of care.   Discharge Recommendations: Rehab  Further Equipment Recommendations for Discharge:  TBD       SUBJECTIVE:   Patient stated I need to pee.       OBJECTIVE DATA SUMMARY:   Critical Behavior:  Neurologic State: Alert, Eyes open spontaneously, Confused  Orientation Level: Disoriented to time, Oriented to person, Oriented to place, Oriented to situation  Cognition: Follows commands     Functional Mobility Training:  Bed Mobility:     Supine to Sit: Moderate assistance  Sit to Supine: Moderate assistance  Scooting: Contact guard assistance        Transfers:  Sit to Stand: Minimum assistance  Stand to Sit: Minimum assistance                             Balance:  Standing - Static: Fair;Constant support  Standing - Dynamic : Poor  Ambulation/Gait Training:  Distance (ft): 40 Feet (ft)  Assistive Device: Walker, rolling;Gait belt  Ambulation - Level of Assistance: Minimal assistance                 Base of Support: Narrowed     Speed/Emma: Pace decreased (<100 feet/min)                                  Stairs:                       Neuro Re-Education:     Therapeutic Exercises:   Standing hip abd, mini squats, hip/knee flexion, toe raises. Pain:  Pain Scale 1: Numeric (0 - 10)  Pain Intensity 1: 0              Activity Tolerance:   Good  Please refer to the flowsheet for vital signs taken during this treatment.   After treatment:   [ ]    Patient left in no apparent distress sitting up in chair  [X]    Patient left in no apparent distress in bed  [X]    Call bell left within reach  [X]    Nursing notified  [ ]    Caregiver present  [X]    Bed alarm activated      COMMUNICATION/COLLABORATION:   The patients plan of care was discussed with: Registered Nurse     Mat Deleon   Time Calculation: 30 mins

## 2017-02-28 NOTE — PROGRESS NOTES
Rosalino Mccarthy [facility]  [address] [facility], [address] [place] [address]  [phone]      Medical Progress Note      NAME:         Kenia Haley   :        12/15/1931  MRM:        [id number]    Date:          2017      Subjective: Ms Earnest Larson She seems more alert although she remains weak. Has a much less cough now. No fever or chills. She has dementia. I have discussed with her nurse for collaborative Hx. Objective:    Vital Signs:    Visit Vitals    /72 (BP 1 Location: Left arm, BP Patient Position: At rest)    Pulse 62    Temp 98.2 °F (36.8 °C)    Resp 19    Ht 5' 5\" (1.651 m)    Wt 59 kg (130 lb)    SpO2 99%    Breastfeeding No    BMI 21.63 kg/m2          Intake/Output Summary (Last 24 hours) at 17 0900  Last data filed at 17 0803   Gross per 24 hour   Intake             1690 ml   Output                0 ml   Net             1690 ml        Physical Examination:    General:   Weak looking elderly female patient, not in any acute distress   Eyes:   pink conjunctivae, PERRLA with no discharge. ENT:   no ottorrhea or rhinorrhea with dry mucous membranes  Pulm:  decreased breath sounds with much less crackles. No wheezes  Card:  no JVD or murmurs, has regular and normal S1, S2 without thrills, bruits or peripheral edema  Abd:  Soft, non-tender, non-distended, normoactive bowel sounds   Musc:  No cyanosis, clubbing, atrophy or deformities. Skin:  No rashes, bruising or ulcers. Neuro: Awake and alert. Not as confused. Moves all extremities. Psych:  Has little insight to illness.      Current Facility-Administered Medications   Medication Dose Route Frequency    piperacillin-tazobactam (ZOSYN) 3.375 g in 0.9% sodium chloride (MBP/ADV) 100 mL  3.375 g IntraVENous Q8H    sodium chloride (NS) flush 5-10 mL  5-10 mL IntraVENous Q8H    sodium chloride (NS) flush 5-10 mL  5-10 mL IntraVENous PRN    acetaminophen (TYLENOL) tablet 650 mg  650 mg Oral Q4H PRN    ondansetron (ZOFRAN) injection 4 mg  4 mg IntraVENous Q4H PRN    ferrous gluconate 324 mg (38 mg iron) tablet 1 Tab  1 Tab Oral DAILY WITH BREAKFAST    donepezil (ARICEPT) tablet 5 mg  5 mg Oral QHS    memantine (NAMENDA) tablet 10 mg  10 mg Oral BID    rivaroxaban (XARELTO) tablet 20 mg  20 mg Oral DAILY WITH DINNER    pantoprazole (PROTONIX) tablet 40 mg  40 mg Oral DAILY    predniSONE (DELTASONE) tablet 10 mg  10 mg Oral QPM    calcium-vitamin D (OS-JANINA) 500 mg-200 unit tablet  1 Tab Oral BID WITH MEALS    cholecalciferol (VITAMIN D3) tablet 1,000 Units  1,000 Units Oral DAILY        Laboratory data and review:    Recent Labs      02/26/17   0402  02/25/17   1800   WBC  10.8  19.9*   HGB  12.7  15.3   HCT  38.3  46.0   PLT  144*  198     Recent Labs      02/28/17   0351  02/27/17   0513  02/26/17   0402  02/25/17   1800   NA  142  143   --   139   K  4.0  4.4   --   4.8   CL  112*  109*   --   100   CO2  20*  17*   --   20*   GLU  177*  220*   --   191*   BUN  43*  52*   --   70*   CREA  1.63*  1.61*  1.36*  2.01*   CA  7.9*  8.4*   --   9.2   MG   --    --    --   2.6*   ALB   --    --   2.3*  3.2*   SGOT   --    --   37  46*   ALT   --    --   16  22   INR   --    --    --   1.1     No components found for: [place]    Other Diagnostics:    Telemetry reviewed:   normal sinus rhythm    Assessment and Plan:    Acute bronchitis/ Acute sinusitis/ SIRS (systemic inflammatory response syndrome) / Leukocytosis / Dehydration /  Lactic acidosis: POA. Cough is better. Sinus disease noted on CT. Aspiration is still a possibility. Continue empiric IV Zosyn while here and change to PO antibiotics when ready for discharge. Mobilize. Acute metabolic encephalopathy POA: likely deterioration of her dementia vs an acute CVA vs metabolic. Initial CT head unremarkable. Remains stable. Brain MRI showed no acute CVA. Continue PT, OT.     Autoimmune hepatitis / Cirrhosis of liver POA: No evidence of ascites, coagulopathy or decomp. Ammonia was normal. Continue prednisone     Elevated serum troponin POA. Likely related to time down, CK nml. Serial troponin trending down. Echo not read. Monitor clinically.      Dementia / Debility / Fall. Likely worsening functional decline. Continue Namenda, Aricept. May need LTC. Seen by PT, OT. CM working on placement. Acute renal failure on CKD (chronic kidney disease) stage 3, GFR 30-59 ml/min. Baseline SCr around 1.2. Heavily favor pre-renal. Cr  Stable. DC fluids.       Iron Deficiency anemia POA: Hgb stable. Continue home iron. Vitamin B12 elevated. Hold further supplementation.      Hx pulmonary embolism POA: Continue xarelto.      Total time spent for the patient's care: [facility] discussed with: Patient, Family and Nursing Staff    Discussed:  Care Plan and D/C Planning    Prophylaxis:  Xarelto    Anticipated Disposition:  SNF/LTC vs SAH/Rehab           ___________________________________________________    Attending Physician:   Bipin Robbins MD

## 2017-03-01 NOTE — PROGRESS NOTES
Rosalino Mccarthy [facility]  [address] [facility], [place], [address] [place] [address]  [phone]      Medical Progress Note      NAME:         Martha Lucas   :        12/15/1931  MRM:        [id number]    Date:          3/1/2017      Subjective: Ms Brian Duarte remains stable and denies any cough, chest discomfort or confusion. No more diarrhea today. Afebrile. Objective:    Vital Signs:    Visit Vitals    /75 (BP 1 Location: Right arm, BP Patient Position: Sitting)    Pulse 66    Temp 97.6 °F (36.4 °C)    Resp 22    Ht 5' 5\" (1.651 m)    Wt 59 kg (130 lb)    SpO2 94%    Breastfeeding No    BMI 21.63 kg/m2        No intake or output data in the 24 hours ending 17 1518     Physical Examination:    General:   Weak looking elderly female patient, not in any acute distress   Eyes:   pink conjunctivae, PERRLA with no discharge. ENT:   no ottorrhea or rhinorrhea with dry mucous membranes  Pulm:  decreased breath sounds with no crackles. No wheezes  Card:  no JVD or murmurs, has regular and normal S1, S2 without thrills, bruits   Abd:  Soft, non-tender, non-distended, normoactive bowel sounds   Musc:  No cyanosis, clubbing, atrophy or deformities. Skin:  No rashes, bruising or ulcers. Neuro: Awake and alert. Not as confused. Moves all extremities. Psych:  Has little insight to illness.      Current Facility-Administered Medications   Medication Dose Route Frequency    loperamide (IMODIUM) capsule 2 mg  2 mg Oral Q4H PRN    guaiFENesin-dextromethorphan (ROBITUSSIN DM) 100-10 mg/5 mL syrup 5 mL  5 mL Oral Q6H PRN    piperacillin-tazobactam (ZOSYN) 3.375 g in 0.9% sodium chloride (MBP/ADV) 100 mL  3.375 g IntraVENous Q8H    sodium chloride (NS) flush 5-10 mL  5-10 mL IntraVENous Q8H    sodium chloride (NS) flush 5-10 mL  5-10 mL IntraVENous PRN    acetaminophen (TYLENOL) tablet 650 mg  650 mg Oral Q4H PRN    ondansetron (ZOFRAN) injection 4 mg  4 mg IntraVENous Q4H PRN    ferrous gluconate 324 mg (38 mg iron) tablet 1 Tab  1 Tab Oral DAILY WITH BREAKFAST    donepezil (ARICEPT) tablet 5 mg  5 mg Oral QHS    memantine (NAMENDA) tablet 10 mg  10 mg Oral BID    rivaroxaban (XARELTO) tablet 20 mg  20 mg Oral DAILY WITH DINNER    pantoprazole (PROTONIX) tablet 40 mg  40 mg Oral DAILY    predniSONE (DELTASONE) tablet 10 mg  10 mg Oral QPM    calcium-vitamin D (OS-JANINA) 500 mg-200 unit tablet  1 Tab Oral BID WITH MEALS    cholecalciferol (VITAMIN D3) tablet 1,000 Units  1,000 Units Oral DAILY        Laboratory data and review:    No results for input(s): WBC, HGB, HCT, PLT, HGBEXT, HCTEXT, PLTEXT, HGBEXT, HCTEXT, PLTEXT in the last 72 hours. Recent Labs      03/01/17   0703  02/28/17   0351  02/27/17   0513   NA  144  142  143   K  3.7  4.0  4.4   CL  112*  112*  109*   CO2  18*  20*  17*   GLU  158*  177*  220*   BUN  28*  43*  52*   CREA  1.67*  1.63*  1.61*   CA  8.1*  7.9*  8.4*     No components found for: [place]gie [place]    Other Diagnostics:    Telemetry reviewed:   normal sinus rhythm    Assessment and Plan:    Acute bronchitis/ Acute sinusitis/ SIRS (systemic inflammatory response syndrome) / Leukocytosis / Dehydration /  Lactic acidosis: POA. Cough has improved. Sinus disease noted on CT. Aspiration is still a possibility. DC IV Zosyn. Start Augmentin. Mobilize as tolerated. Acute metabolic encephalopathy POA: likely deterioration of her dementia vs metabolic. Initial CT head unremarkable. Brain MRI showed no acute CVA. Clinically better. Continue PT, OT. Autoimmune hepatitis / Cirrhosis of liver POA: No evidence of ascites, coagulopathy or decomp. Ammonia was normal. Continue prednisone     Elevated serum troponin POA. Likely related to time down, CK nml. Serial troponin unremarkable. Echo showed a normal EF. No further testing.       Dementia / Debility / Fall. Likely worsening functional decline. Continue Namenda, Aricept. Continue PT, OT. SNF once arrangements are completed       Acute renal failure on CKD (chronic kidney disease) stage 3, GFR 30-59 ml/min. Baseline SCr around 1.2. Heavily favor pre-renal. Cr  Stable.        Iron Deficiency anemia POA: Hgb stable. Continue home iron. Vitamin B12 elevated. Hold further supplementation.      Hx pulmonary embolism POA: Has poor creatinine clearance. Will discontinue xarelto.  Start Coumadin which she will need lifelong anticoagulation    Total time spent for the patient's care: [address] discussed with: Patient, Family and Nursing Staff    Discussed:  Care Plan and D/C Planning    Prophylaxis:  Xarelto    Anticipated Disposition:  SNF/LTC vs SAH/Rehab           ___________________________________________________    Attending Physician:   Keisha Llamas MD

## 2017-03-01 NOTE — PROGRESS NOTES
Palliative Medicine Consult  Kaz: [phone] ([phone])    Patient Name: Shiraz Duff  YOB: 1931    Date of Initial Consult: 2/27/17  Reason for Consult: Care Decisions  Requesting Provider:  Felicia Adan MD  Primary Care Physician: Marlyn Javier MD      SUMMARY:   Shiraz Duff is a 80 y.o. with a past history of  Autoimmune hepatitis, CKD stage 3 , h/o malignant carcinoid tumor, dementia, at [facility] independent for ADLS. She was admitted on 2/25/2017 from home, with a diagnosis of  Fall, laying on the floor for two days in her home with  Sirs/leukocytosis, dehydration , acute metabolic encephalopathy. W/U negative for any injury ,fracture or strokes. Current medical issues leading to Palliative Medicine involvement include: care decisions in a setting of dementia with metabolic encephalopathy. Socail history : lives independantly in apartment, she has a son/mpoa who lives 10 mins away from her and keeps a check on her daily,  Manages pill box and his finances. She is non compliant with walker and lately is noted to have unsteady gait. PALLIATIVE DIAGNOSES:   1. Hypoalbuminemia   2. Cough 2/2 bronchitis  3. Dementia  4. Unsteady gait  5. Confusion  6. Physical debility        PLAN:   Pt seen without family today. No symptom burden appreciated  Goals are clear to transition home with 24 hour care followed by facility placement pending the patient's status per chart notes. DDNR scanned in EMR  Not much more to offer. Will sign off. Appreciate consult    Communicated plan of care with: Palliative IDT       GOALS OF CARE / TREATMENT PREFERENCES:   [====Goals of Care====]  GOALS OF CARE:  Patient / health care proxy stated goals: conservative measures, home with 24/7 help and ttransition to assisted living in near future.       TREATMENT PREFERENCES:   Code Status: DNR    Advance Care Planning:  Advance Care Planning 2/25/2017   Patient's Healthcare Decision Maker is: Legal Next of Kin   Primary Decision Maker Name Sybil Shah   Primary Decision Maker Phone Number [phone]   Confirm Advance Directive Yes, on file       Other:    The palliative care team has discussed with patient / health care proxy about goals of care / treatment preferences for patient.  [====Goals of Care====]         HISTORY:     History obtained from: chart and son. CHIEF COMPLAINT: fall    HPI/SUBJECTIVE:    The patient is:   [x] Verbal and participatory, minimal participatory because of dementia  [] Non-participatory due to:       Clinical Pain Assessment (nonverbal scale for severity on nonverbal patients):   [++++ Clinical Pain Assessment++++]  [++++Pain Severity++++]: Pain: 0   [++++Pain Character++++]:   [++++Pain Duration++++]:   [++++Pain Effect++++]:   [++++Pain Factors++++]:   [++++Pain Frequency++++]:   [++++Pain Location++++]:   [++++ Clinical Pain Assessment++++]     FUNCTIONAL ASSESSMENT:     Palliative Performance Scale (PPS):  PPS: 70       PSYCHOSOCIAL/SPIRITUAL SCREENING:     Advance Care Planning:  Advance Care Planning 2/25/2017   Patient's Healthcare Decision Maker is: Legal Next of Kin   Primary Decision Maker Name Sybil Shah   Primary Decision Maker Phone Number [phone]   Confirm Advance Directive Yes, on file        Any spiritual / Amish concerns:  [] Yes /  [x] No    Caregiver Burnout:  [] Yes /  [] No /  [x] No Caregiver Present      Anticipatory grief assessment:   [] Normal  / [] Maladaptive       ESAS Anxiety: Anxiety: 0    ESAS Depression: Depression: 0        REVIEW OF SYSTEMS:     Positive and pertinent negative findings in ROS are noted above in HPI. The following systems were [x] reviewed / [] unable to be reviewed as noted in HPI  Other findings are noted below. Systems: constitutional, ears/nose/mouth/throat, respiratory, gastrointestinal, genitourinary, musculoskeletal, integumentary, neurologic, psychiatric, endocrine.  Positive findings noted below. Modified ESAS Completed by: provider   Fatigue: 0 Drowsiness: 0   Depression: 0 Pain: 0   Anxiety: 0 Nausea: 0   Anorexia: 0 Dyspnea: 0     Constipation: No              PHYSICAL EXAM:     From RN flowsheet:  Wt Readings from Last 3 Encounters:   02/25/17 130 lb (59 kg)   02/26/17 130 lb (59 kg)   08/31/16 145 lb (65.8 kg)     Blood pressure 173/76, pulse 61, temperature 97.7 °F (36.5 °C), resp. rate 20, height 5' 5\" (1.651 m), weight 130 lb (59 kg), SpO2 97 %, not currently breastfeeding. Pain Scale 1: Numeric (0 - 10)  Pain Intensity 1: 0                 Last bowel movement, if known:     Constitutional: alert , oriented x 2, confused.   Eyes: pupils equal, anicteric  ENMT: no nasal discharge, moist mucous membranes  Cardiovascular: regular rhythm, distal pulses intact  Respiratory: breathing not labored, symmetric  Gastrointestinal: soft non-tender, +bowel sounds  Musculoskeletal: no deformity, no tenderness to palpation  Skin: warm, dry  Neurologic: following commands, moving all extremities  Psychiatric: full affect, no hallucinations  Other:       HISTORY:     Active Problems:    Autoimmune hepatitis ([address] (2/26/2010)      Overview: Goes to [facility]- had a recent even and was anemic- had a DVT 1/24/10- colon       10/09 normal- biopsy has shown the cirrhosis      Dementia (5/23/2011)      SIRS (systemic inflammatory response syndrome) (HCC) (2/21/2013)      CKD (chronic kidney disease) stage 3, GFR 30-59 ml/min (2/27/2013)      Cirrhosis of liver (HCC) ()      Hx pulmonary embolism (2/27/2013)      Acute metabolic encephalopathy (0/24/7533)      Dehydration (2/25/2017)      Lactic acidosis (2/25/2017)      Past Medical History:   Diagnosis Date    Anemia 2/26/2010    pancytopenia from [facility]    Autoimmune hepatitis ([address]     Autoimmune hepatitis ([address] 2/26/2010    Autoimmune hepatitis ([address] 2/26/2010    Cirrhosis of liver (HCC)     Cirrhosis of liver ([address]     CKD (chronic kidney disease) stage 3, GFR 30-59 ml/min     Dementia     HSV epithelial keratitis     Hx of deep vein thrombophlebitis of lower extremity 02/27/13    Hx of malignant carcinoid tumor of kidney 2001    Hx pulmonary embolism 02/27/13    Osteoporosis     Renal cell carcinoma ([facility] 2/26/2010      Past Surgical History:   Procedure Laterality Date    HX HYSTERECTOMY      HX NEPHRECTOMY  2001    right      Family History   Problem Relation Age of Onset    Heart Failure Mother     Cancer Father      unknown type    Heart Disease Sister     Cancer Brother      melanoma    Cancer Brother      unknown type      History reviewed, no pertinent family history.   Social History   Substance Use Topics    Smoking status: Never Smoker    Smokeless tobacco: Never Used    Alcohol use No     No Known Allergies   Current Facility-Administered Medications   Medication Dose Route Frequency    loperamide (IMODIUM) capsule 2 mg  2 mg Oral Q4H PRN    guaiFENesin-dextromethorphan (ROBITUSSIN DM) 100-10 mg/5 mL syrup 5 mL  5 mL Oral Q6H PRN    piperacillin-tazobactam (ZOSYN) 3.375 g in 0.9% sodium chloride (MBP/ADV) 100 mL  3.375 g IntraVENous Q8H    sodium chloride (NS) flush 5-10 mL  5-10 mL IntraVENous Q8H    sodium chloride (NS) flush 5-10 mL  5-10 mL IntraVENous PRN    acetaminophen (TYLENOL) tablet 650 mg  650 mg Oral Q4H PRN    ondansetron (ZOFRAN) injection 4 mg  4 mg IntraVENous Q4H PRN    ferrous gluconate 324 mg (38 mg iron) tablet 1 Tab  1 Tab Oral DAILY WITH BREAKFAST    donepezil (ARICEPT) tablet 5 mg  5 mg Oral QHS    memantine (NAMENDA) tablet 10 mg  10 mg Oral BID    rivaroxaban (XARELTO) tablet 20 mg  20 mg Oral DAILY WITH DINNER    pantoprazole (PROTONIX) tablet 40 mg  40 mg Oral DAILY    predniSONE (DELTASONE) tablet 10 mg  10 mg Oral QPM    calcium-vitamin D (OS-JANINA) 500 mg-200 unit tablet  1 Tab Oral BID WITH MEALS    cholecalciferol (VITAMIN D3) tablet 1,000 Units  1,000 Units Oral DAILY LAB AND IMAGING FINDINGS:     Lab Results   Component Value Date/Time    WBC 10.8 02/26/2017 04:02 AM    HGB 12.7 02/26/2017 04:02 AM    PLATELET 619 92/03/6974 04:02 AM     Lab Results   Component Value Date/Time    Sodium 144 03/01/2017 07:03 AM    Potassium 3.7 03/01/2017 07:03 AM    Chloride 112 03/01/2017 07:03 AM    CO2 18 03/01/2017 07:03 AM    BUN 28 03/01/2017 07:03 AM    Creatinine 1.67 03/01/2017 07:03 AM    Calcium 8.1 03/01/2017 07:03 AM    Magnesium 2.6 02/25/2017 06:00 PM    Phosphorus 2.6 04/07/2014 05:00 AM      Lab Results   Component Value Date/Time    AST (SGOT) 37 02/26/2017 04:02 AM    Alk. phosphatase 43 02/26/2017 04:02 AM    Protein, total 6.3 02/26/2017 04:02 AM    Albumin 2.3 02/26/2017 04:02 AM    Globulin 4.0 02/26/2017 04:02 AM     Lab Results   Component Value Date/Time    INR 1.1 02/25/2017 06:00 PM    Prothrombin time 10.9 02/25/2017 06:00 PM    aPTT 26.3 02/25/2017 06:00 PM      Lab Results   Component Value Date/Time    Iron 33 09/24/2015 01:04 PM    TIBC 452 09/24/2015 01:04 PM    Iron % saturation 7 09/24/2015 01:04 PM    Ferritin 7 09/24/2015 01:04 PM      No results found for: PH, PCO2, PO2  No components found for: [place]   Lab Results   Component Value Date/Time     02/25/2017 06:00 PM    CK - MB <0.5 02/27/2013 04:40 PM                 Total time: 25 mins  Counseling / coordination time: 15 mins  > 50% counseling / coordination?:  Yes    Prolonged service was provided for  []30 min   []75 min in face to face time in the presence of the patient. Time Start:   Time End:   Note: this can only be billed with 50492 (initial) or 12909 (follow up). If multiple start / stop times, list each separately.

## 2017-03-01 NOTE — PROGRESS NOTES
1041: Notified MD Arcelia Benitez of pt's c/o pain in R leg/hip area upon turning pt. No new orders at this time. Will continue to monitor. 1930: Bedside and Verbal shift change report given to [employer] [facility] (oncoming nurse) by Flori CUEVAS (offgoing nurse). Report included the following information SBAR, Kardex, Intake/Output, Recent Results and Cardiac Rhythm Sinus arrhythmia .

## 2017-03-01 NOTE — PROGRESS NOTES
Pharmacy Dosing Services:     Augmentin dose decreased from 875 mg PO BID to 500 mg PO BID per P&T protocol.  - CrCl 22 ml/min    Thank you,  Yuko Goodman, PharmD

## 2017-03-01 NOTE — PROGRESS NOTES
Problem: Mobility Impaired (Adult and Pediatric)  Goal: *Acute Goals and Plan of Care (Insert Text)  Physical Therapy Goals  Initiated 2/27/2017  1. Patient will move from supine to sit and sit to supine , scoot up and down and roll side to side in bed with independence within 7 day(s). 2. Patient will transfer from bed to chair and chair to bed with independence using the least restrictive device within 7 day(s). 3. Patient will perform sit to stand with independence within 7 day(s). 4. Patient will ambulate with independence for 200 feet with the least restrictive device within 7 day(s). PHYSICAL THERAPY TREATMENT  Patient: Afshan Ruby (64 y.o. female)  Date: 3/1/2017  Diagnosis: Acute metabolic encephalopathy <principal problem not specified>       Precautions:   falls      ASSESSMENT:  Pt received supine in bed. Willing to participate in PT tx. Supine to sit with Supervision. Pt stating that she was found on floor at home by her children. Able to walk 150'  today with RW and Min A. Some path deviations to the L. Pt agreeable to sit up in chair. Alarm set and call bell near. Good progress today. Rehab vs  HHPT with assist at home? Progression toward goals:  [X]    Improving appropriately and progressing toward goals  [ ]    Improving slowly and progressing toward goals  [ ]    Not making progress toward goals and plan of care will be adjusted       PLAN:  Patient continues to benefit from skilled intervention to address the above impairments. Continue treatment per established plan of care. Discharge Recommendations:  Rehab vs Home Health  Further Equipment Recommendations for Discharge:   rolling walker? SUBJECTIVE:   Patient stated I need my clothes! Jose Watson      OBJECTIVE DATA SUMMARY:   Critical Behavior:  Neurologic State: Alert, Eyes open spontaneously  Orientation Level: Disoriented to situation, Oriented to person, Oriented to place  Cognition: Appropriate for age attention/concentration, Follows commands     Functional Mobility Training:  Bed Mobility:  Rolling: Supervision  Supine to Sit: Supervision     Scooting: Supervision        Transfers:  Sit to Stand: Minimum assistance  Stand to Sit: Minimum assistance                             Balance:  Sitting: Intact  Standing: Intact; With support  Standing - Static: Good;Constant support  Standing - Dynamic : Fair  Ambulation/Gait Training:  Distance (ft): 100 Feet (ft) (x2)  Assistive Device: Walker, rolling;Gait belt  Ambulation - Level of Assistance: Minimal assistance;Assist x2        Gait Abnormalities: Path deviations                                               Pain:  Pain Scale 1: Numeric (0 - 10)  Pain Intensity 1: 0              Activity Tolerance:   good  Please refer to the flowsheet for vital signs taken during this treatment.   After treatment:   [X]    Patient left in no apparent distress sitting up in chair  [ ]    Patient left in no apparent distress in bed  [X]    Call bell left within reach  [X]    Nursing notified  [ ]    Caregiver present  [X]     alarm activated      COMMUNICATION/COLLABORATION:   The patients plan of care was discussed with: Registered Nurse     Johanny Tom, PT   Time Calculation: 25 mins

## 2017-03-01 NOTE — PROGRESS NOTES
3/1/17 Pt has been accepted to [facility]. [facility]. Elizabeth Menendez LCSW    3/1/17 Cm spoke with pt's daughter in law Jose D Harry) re snf options. Family would like referral to [facility], as pt has been there previously and had a good experience. Cm sent referral via ecin and will follow up. It is noteworthy that pt has [employer] [place] [employer] and they insurance [place] will need to be obtained for snf placement.  Elizabeth Menendez LCSW

## 2017-03-01 NOTE — PROGRESS NOTES
Interdisciplinary team rounds were held 3/1/2017 with the following team members:Care Management, Nursing, Nutrition and Physical Therapy and the primary RN. Plan of care discussed. See clinical pathway and/or care plan for interventions and desired outcomes.     Discharge to SNF when bed is available

## 2017-03-01 NOTE — PROGRESS NOTES
Bedside and Verbal shift change report given to  Zhang Lemos Rn (oncoming nurse) by  Montserrat Maloney (offgoing nurse). Report included the following information SBAR, Kardex, Intake/Output, MAR, Accordion, Recent Results, Med Rec Status and Cardiac Rhythm  NSR.

## 2017-03-01 NOTE — PROGRESS NOTES
[facility] Pharmacy Dosing Services  Consult for Warfarin Dosing by Pharmacy by Dr. Ivan Trivedi  Indication: h/o PE  Day of Therapy: 1 (changed from rivaroxaban due to renal dysfunction)  Dose to achieve an INR goal of 2-3    Order entered for warfarin 5 mg PO today at 18:00. Significant drug interactions: rivaroxaban discontinued today  Previous dose given New start   PT/INR Lab Results   Component Value Date/Time    INR 1.1 02/25/2017 06:00 PM      Platelets Lab Results   Component Value Date/Time    PLATELET 618 04/36/2776 04:02 AM      H/H Lab Results   Component Value Date/Time    HGB 12.7 02/26/2017 04:02 AM        Pharmacy will follow daily and will provide subsequent warfarin dosing based on clinical status.     Thank you,  Samia Jacobson, PharmD, [facility]

## 2017-03-01 NOTE — PROGRESS NOTES
Bedside and Verbal shift change report given to [employer] (oncoming nurse) by Ramila Womack (offgoing nurse). Report included the following information SBAR, Kardex, Intake/Output, MAR and Recent Results.

## 2017-03-02 NOTE — PROGRESS NOTES
[facility] [address]  [address] [facility], [address] [place] [address]  [phone]      Medical Progress Note      NAME: Afshan Ruby   :  12/15/1931  MRM:  [id number]    Date/Time: 3/2/2017  10:32 AM       Assessment and Plan:   1. Acute bronchitis/ Acute sinusitis/ SIRS (systemic inflammatory response syndrome) / Leukocytosis / Dehydration / Lactic acidosis: POA. Cough is better. Sinus disease noted on CT. on Zosyn.      2. Acute metabolic encephalopathy POA: initial CT head unremarkable. Remains stable. Brain MRI showed no acute CVA. Continue PT, OT.     3. Autoimmune hepatitis / Cirrhosis of liver POA: No evidence of ascites, coagulopathy or decomp. Continue prednisone      4. Elevated serum troponin POA. Serial troponin trending down. Echo is unremarkable.       5. Dementia / Debility / Fall. Likely worsening functional decline. Continue Namenda, Aricept. Continue PT, OT.        6.  Acute renal failure on CKD (chronic kidney disease) stage 3, GFR 30-59 ml/min. Cr Stable.         7. Iron Deficiency anemia POA: Hgb stable. Continue home iron.         8. Hx pulmonary embolism POA: was on xarelto, but now on coumadin due to CKD. Subjective:     Chief Complaint:  Feels well. ROS:  (bold if positive, if negative)      Tolerating PT  Tolerating Diet        Objective:     Last 24hrs VS reviewed since prior progress note.  Most recent are:    Visit Vitals    /85 (BP 1 Location: Right arm, BP Patient Position: At rest)    Pulse 61    Temp 97.9 °F (36.6 °C)    Resp 16    Ht 5' 5\" (1.651 m)    Wt 59 kg (130 lb)    SpO2 94%    Breastfeeding No    BMI 21.63 kg/m2     SpO2 Readings from Last 6 Encounters:   17 94%   16 98%   12/01/15 99%   11/23/15 99%   09/24/15 99%   05/12/15 96%    O2 Flow Rate (L/min): 2 l/min   No intake or output data in the 24 hours ending 17 1032     Physical Exam:    Gen:  Well-developed, well-nourished, in no acute distress  HEENT:  Pink conjunctivae, PERRL, hearing intact to voice, moist mucous membranes  Neck:  Supple, without masses, thyroid non-tender  Resp:  No accessory muscle use, clear breath sounds without wheezes rales or rhonchi  Card:  No murmurs, normal S1, S2 without thrills, bruits or peripheral edema  Abd:  Soft, non-tender, non-distended, normoactive bowel sounds are present, no palpable organomegaly and no detectable hernias  Lymph:  No cervical or inguinal adenopathy  Musc:  No cyanosis or clubbing  Skin:  No rashes or ulcers, skin turgor is good  Neuro:  Cranial nerves are grossly intact, no focal motor weakness, follows commands appropriately  Psych:  Good insight, oriented to person, place and time, alert  __________________________________________________________________  Medications Reviewed: (see below)  Medications:     Current Facility-Administered Medications   Medication Dose Route Frequency    amoxicillin-clavulanate (AUGMENTIN) 500-125 mg per tablet 1 Tab  1 Tab Oral Q12H    memantine (NAMENDA) tablet 5 mg  5 mg Oral BID    warfarin - Pharmacist to dose   Other Rx Dosing/Monitoring    loperamide (IMODIUM) capsule 2 mg  2 mg Oral Q4H PRN    guaiFENesin-dextromethorphan (ROBITUSSIN DM) 100-10 mg/5 mL syrup 5 mL  5 mL Oral Q6H PRN    sodium chloride (NS) flush 5-10 mL  5-10 mL IntraVENous Q8H    sodium chloride (NS) flush 5-10 mL  5-10 mL IntraVENous PRN    acetaminophen (TYLENOL) tablet 650 mg  650 mg Oral Q4H PRN    ondansetron (ZOFRAN) injection 4 mg  4 mg IntraVENous Q4H PRN    ferrous gluconate 324 mg (38 mg iron) tablet 1 Tab  1 Tab Oral DAILY WITH BREAKFAST    donepezil (ARICEPT) tablet 5 mg  5 mg Oral QHS    pantoprazole (PROTONIX) tablet 40 mg  40 mg Oral DAILY    predniSONE (DELTASONE) tablet 10 mg  10 mg Oral QPM    calcium-vitamin D (OS-JANINA) 500 mg-200 unit tablet  1 Tab Oral BID WITH MEALS    cholecalciferol (VITAMIN D3) tablet 1,000 Units  1,000 Units Oral DAILY Lab Data Reviewed: (see below)  Lab Review:     No results for input(s): WBC, HGB, HCT, PLT, HGBEXT, HCTEXT, PLTEXT in the last 72 hours. Recent Labs      03/02/17 0425 03/01/17   0703  02/28/17   0351   NA   --   144  142   K   --   3.7  4.0   CL   --   112*  112*   CO2   --   18*  20*   GLU   --   158*  177*   BUN   --   28*  43*   CREA   --   1.67*  1.63*   CA   --   8.1*  7.9*   INR  1.2*   --    --      Lab Results   Component Value Date/Time    Glucose (POC) 112 01/24/2010 01:52 PM     No results for input(s): PH, PCO2, PO2, HCO3, FIO2 in the last 72 hours. Recent Labs      03/02/17 0425   INR  1.2*     All Micro Results     Procedure Component Value Units Date/Time    CULTURE, BLOOD [[id number]] Collected:  02/25/17 1833    Order Status:  Completed Specimen:  Blood from Blood Updated:  03/02/17 1006     Special Requests: NO SPECIAL REQUESTS        Culture result: NO GROWTH 5 DAYS             I have reviewed notes of prior 24hr. Other pertinent lab:       Total time spent with patient: [address] discussed with: Patient, Nursing Staff and >50% of time spent in counseling and coordination of care    Discussed:  Care Plan    Prophylaxis:  Coumadin    Disposition:  SNF/LTC           ___________________________________________________    Attending Physician: Leola Hyde MD

## 2017-03-02 NOTE — PROGRESS NOTES
Nutrition Assessment:    RECOMMENDATIONS/INTERVENTION(S):     Bowel Regimen, last recorded bowel movement 2/25/17  Continue Regular, liberalized diet as tolerated  Encourage Ensure Compact BID  Monitor labs  Continue to monitor appetite/PO intake, diet tolerance, and acceptance of ONS    ASSESSMENT:   3/2: F/u: po intake improved. Pt eating well at meals and states she drinks what supplements are provided to her.    2/27: Chart reviewed. This is an 79 yo female admitted with SIRs, metabolic encephalopathy, dehydration. Hx cirrhosis, autoimmune hepatitis, dementia, debility, CKD3. Intake of meals poor, consuming 10-35%  Labs/Meds reviewed. IVF infusing. LBM noted 2/25. No pressure injury documented, + excoriation to sacral/coccyx area. Per H&P, pt with overall decline, debility, recent fall. Poor PO intake PTA. Weight record reviewed. Pt with a  10.3% weight decrease x 6 months, amount considered significant for timeframe.       Meets Criteria for Severe Chronic Malnutrition as evidenced by:   [] Severe muscle wasting, loss of subcutaneous fat   [x] Nutritional intake of <75% of recommended intake for >1 month   [x] Weight loss of  >5% in 1 month, >7.5% in 3 months, >10% in 6 months, >20% in 1 year   [] Severe edema       SUBJECTIVE/OBJECTIVE:     Diet Order: Regular  % Eaten:    Patient Vitals for the past 72 hrs:   % Diet Eaten   03/02/17 1243 100 %   03/02/17 0826 100 %   02/28/17 0803 40 %   02/27/17 1340 35 %     Pertinent Medications: [x] Reviewed    Chemistries:  Lab Results   Component Value Date/Time    Sodium 144 03/01/2017 07:03 AM    Potassium 3.7 03/01/2017 07:03 AM    Chloride 112 03/01/2017 07:03 AM    CO2 18 03/01/2017 07:03 AM    Anion gap 14 03/01/2017 07:03 AM    Glucose 158 03/01/2017 07:03 AM    BUN 28 03/01/2017 07:03 AM    Creatinine 1.67 03/01/2017 07:03 AM    BUN/Creatinine ratio 17 03/01/2017 07:03 AM    GFR est AA 35 03/01/2017 07:03 AM    GFR est non-AA 29 03/01/2017 07:03 AM Calcium 8.1 03/01/2017 07:03 AM    AST (SGOT) 37 02/26/2017 04:02 AM    Alk. phosphatase 43 02/26/2017 04:02 AM    Protein, total 6.3 02/26/2017 04:02 AM    Albumin 2.3 02/26/2017 04:02 AM    Globulin 4.0 02/26/2017 04:02 AM    A-G Ratio 0.6 02/26/2017 04:02 AM    ALT (SGPT) 16 02/26/2017 04:02 AM      Anthropometrics: Height: 5' 5\" (165.1 cm) Weight: 59 kg (130 lb)    IBW (%IBW): 56.8 kg (125 lb 3.5 oz) ( ) UBW (%UBW):   (  %)    BMI: Body mass index is 21.63 kg/(m^2). This BMI is indicative of:   [x] Underweight for age  [] Normal    [] Overweight    []  Obesity    []  Extreme Obesity (BMI>40)  Estimated Nutrition Needs (Based on): 1347 Kcals/day (BMR (1036) X 1.3 AF) , 59 g (-71 g/d (1.0-1.2 g/Kg body wt)) Protein  Carbohydrate: At Least 130 g/day  Fluids: 1350 mL/day    Last BM: 2/25   [x]Active     []Hyperactive  []Hypoactive       [] Absent   BS  Skin:    [] Intact   [] Incision  [] Breakdown   [] DTI   [x] Tears/Excoriation/Abrasion  []Edema [] Other:    Wt Readings from Last 30 Encounters:   02/25/17 59 kg (130 lb)   02/26/17 59 kg (130 lb)   08/31/16 65.8 kg (145 lb)   01/04/16 64.6 kg (142 lb 6.4 oz)   11/22/15 65.4 kg (144 lb 3.2 oz)   09/24/15 61.2 kg (135 lb)   05/12/15 63 kg (139 lb)   03/30/15 64.4 kg (142 lb)   05/30/14 58.7 kg (129 lb 6.4 oz)   05/23/14 57.6 kg (127 lb)   05/19/14 56.7 kg (125 lb)   04/28/14 59.2 kg (130 lb 9.6 oz)   04/08/14 61.7 kg (136 lb)   04/02/14 59.9 kg (132 lb)   02/17/14 61.2 kg (135 lb)   01/13/14 60.5 kg (133 lb 6.4 oz)   11/06/13 58.8 kg (129 lb 9.6 oz)   09/23/13 58.5 kg (129 lb)   09/09/13 58.6 kg (129 lb 3.2 oz)   08/05/13 58.2 kg (128 lb 6.4 oz)   07/15/13 56.4 kg (124 lb 6.4 oz)   06/20/13 56.2 kg (123 lb 12.8 oz)   05/28/13 56.3 kg (124 lb 3.2 oz)   05/13/13 56.3 kg (124 lb 3.2 oz)   04/29/13 56.4 kg (124 lb 6.4 oz)   04/01/13 55.9 kg (123 lb 3.2 oz)   03/18/13 54.8 kg (120 lb 12.8 oz)   02/27/13 54.4 kg (120 lb)   02/21/13 54.9 kg (121 lb)   02/15/13 54.9 kg (121 lb)      NUTRITION DIAGNOSES:   Problem:  Inadequate protein-energy intake   Unintended weight loss  Etiology: related to decresaed appetite   debility, dementia  Signs/Symptoms: as evidenced by <50% PO intake of meals  10.3% weight decrease x 6 months Improving    NUTRITION INTERVENTIONS:  Meals/Snacks: General/healthful diet   Supplements: Commercial supplement              GOAL:   Pt will continue to eat well at meals and supplements % within 5-7 days    Cultural, Scientologist, or Ethnic Dietary Needs: None     LEARNING NEEDS (Diet, Food/Nutrient-Drug Interaction):    [x] None Identified   [] Identified and Education Provided/Documented   [] Identified and Pt declined/was not appropriate      [] Interdisciplinary Care Plan Reviewed/Documented    [x] Discharge Needs:   See dc instructions   [] No Nutrition Related Discharge Needs    NUTRITION RISK:   Pt Is At Nutrition Risk  [x]     No Nutrition Risk Identified  []       PT SEEN FOR:    []  MD Consult: []Calorie Count      []Diabetic Diet Education        []Diet Education     []Electrolyte Management     []General Nutrition Management and Supplements     []Management of Tube Feeding     []TPN Recommendations    [x]  RN Referral:  [x]MST score >=2     []Enteral/Parenteral Nutrition PTA     []Pregnant: Gestational DM or Multigestation                 [] Pressure Ulcer  []  Low BMI  []  Length of Stay; Dysphagia Diet          Constanza Carlisle RD

## 2017-03-02 NOTE — PROGRESS NOTES
[facility] Dosing Services: 3/2/17    Consult for Warfarin Dosing by Pharmacy by Dr. Charles Thakur provided for this 80 y.o.  female , for indication of PE history  Day of Therapy 2  Dose to achieve an INR goal of 2-3    Order entered for  Warfarin  5 (mg) ordered to be given today at 18:00. Significant drug interactions: None  Previous dose given 5mg 3/1/17   PT/INR Lab Results   Component Value Date/Time    INR 1.2 03/02/2017 04:25 AM      Platelets Lab Results   Component Value Date/Time    PLATELET 654 59/82/3040 04:02 AM      H/H Lab Results   Component Value Date/Time    HGB 12.7 02/26/2017 04:02 AM      Pharmacy to follow daily and will provide subsequent Warfarin dosing based on clinical status.     Lida Hussein, PharmD, BCPS  Contact information

## 2017-03-02 NOTE — PROGRESS NOTES
Spoke with Niraj Stewart, admissions coordinator at [facility]. They are still waiting on authorization. Jn Luke LCSW    3:35 pm:  Contacted [facility]. Spoke with Julianna Hendrickson. They still have not received authorization on pt.   Jn Luke LCSW

## 2017-03-02 NOTE — PROGRESS NOTES
Interdisciplinary team rounds were held 3/2/2017 with the following team members:Care Management, Nursing, Nutrition, Palliative Care, Pharmacy and Physical Therapy and the Primary RN. Plan of care discussed. See clinical pathway and/or care plan for interventions and desired outcomes.     Discharge today is insurance authorizes

## 2017-03-03 PROBLEM — E87.20 LACTIC ACIDOSIS: Status: RESOLVED | Noted: 2017-01-01 | Resolved: 2017-01-01

## 2017-03-03 PROBLEM — E86.0 DEHYDRATION: Status: RESOLVED | Noted: 2017-01-01 | Resolved: 2017-01-01

## 2017-03-03 PROBLEM — G93.41 ACUTE METABOLIC ENCEPHALOPATHY: Status: RESOLVED | Noted: 2017-01-01 | Resolved: 2017-01-01

## 2017-03-03 NOTE — PROGRESS NOTES
Interdisciplinary team rounds were held 3/3/2017 with the following team members:Care Management, Nursing and Physician and the primary RN. Plan of care discussed. See clinical pathway and/or care plan for interventions and desired outcomes.     Discharge today

## 2017-03-03 NOTE — PROGRESS NOTES
[facility]  [address] [facility], [address] [place] [address]  [phone]      Medical Progress Note      NAME: Reddy Herman   :  12/15/1931  MRM:  [id number]    Date/Time: 3/3/2017  10:32 AM       Assessment and Plan:   1. Acute bronchitis/ Acute sinusitis/ SIRS (systemic inflammatory response syndrome) / Leukocytosis / Dehydration / Lactic acidosis: POA. Cough is better. Sinus disease noted on CT. on augmentin.      2. Acute metabolic encephalopathy POA: initial CT head unremarkable. Remains stable. Brain MRI showed no acute CVA. Continue PT, OT.     3. Autoimmune hepatitis / Cirrhosis of liver POA: No evidence of ascites, coagulopathy or decomp. Continue prednisone      4. Elevated serum troponin POA. Serial troponin trending down. Echo is unremarkable.       5. Dementia / Debility / Fall. Likely worsening functional decline. Continue Namenda, Aricept. Continue PT, OT.        6.  Acute renal failure on CKD (chronic kidney disease) stage 3, GFR 30-59 ml/min. Cr Stable.         7. Iron Deficiency anemia POA: Hgb stable. Continue home iron.         8. Hx pulmonary embolism POA: was on xarelto, but now on coumadin due to CKD. Subjective:     Chief Complaint:  Feels well. ROS:  (bold if positive, if negative)      Tolerating PT  Tolerating Diet        Objective:     Last 24hrs VS reviewed since prior progress note.  Most recent are:    Visit Vitals    /81 (BP 1 Location: Right arm, BP Patient Position: At rest)    Pulse 94    Temp 98.4 °F (36.9 °C)    Resp 16    Ht 5' 5\" (1.651 m)    Wt 59 kg (130 lb)    SpO2 95%    Breastfeeding No    BMI 21.63 kg/m2     SpO2 Readings from Last 6 Encounters:   17 95%   16 98%   12/01/15 99%   11/23/15 99%   09/24/15 99%   05/12/15 96%    O2 Flow Rate (L/min): 2 l/min       Intake/Output Summary (Last 24 hours) at 17 0909  Last data filed at 17 0511   Gross per 24 hour   Intake 300 ml   Output              150 ml   Net              150 ml        Physical Exam:    Gen:  Well-developed, well-nourished, in no acute distress  HEENT:  Pink conjunctivae, PERRL, hearing intact to voice, moist mucous membranes  Neck:  Supple, without masses, thyroid non-tender  Resp:  No accessory muscle use, clear breath sounds without wheezes rales or rhonchi  Card:  No murmurs, normal S1, S2 without thrills, bruits or peripheral edema  Abd:  Soft, non-tender, non-distended, normoactive bowel sounds are present, no palpable organomegaly and no detectable hernias  Lymph:  No cervical or inguinal adenopathy  Musc:  No cyanosis or clubbing  Skin:  No rashes or ulcers, skin turgor is good  Neuro:  Cranial nerves are grossly intact, no focal motor weakness, follows commands appropriately  Psych:  Good insight, oriented to person, place and time, alert  __________________________________________________________________  Medications Reviewed: (see below)  Medications:     Current Facility-Administered Medications   Medication Dose Route Frequency    hydrALAZINE (APRESOLINE) 20 mg/mL injection 10 mg  10 mg IntraVENous Q2H PRN    amoxicillin-clavulanate (AUGMENTIN) 500-125 mg per tablet 1 Tab  1 Tab Oral Q12H    memantine (NAMENDA) tablet 5 mg  5 mg Oral BID    warfarin - Pharmacist to dose   Other Rx Dosing/Monitoring    loperamide (IMODIUM) capsule 2 mg  2 mg Oral Q4H PRN    guaiFENesin-dextromethorphan (ROBITUSSIN DM) 100-10 mg/5 mL syrup 5 mL  5 mL Oral Q6H PRN    sodium chloride (NS) flush 5-10 mL  5-10 mL IntraVENous Q8H    sodium chloride (NS) flush 5-10 mL  5-10 mL IntraVENous PRN    acetaminophen (TYLENOL) tablet 650 mg  650 mg Oral Q4H PRN    ondansetron (ZOFRAN) injection 4 mg  4 mg IntraVENous Q4H PRN    ferrous gluconate 324 mg (38 mg iron) tablet 1 Tab  1 Tab Oral DAILY WITH BREAKFAST    donepezil (ARICEPT) tablet 5 mg  5 mg Oral QHS    pantoprazole (PROTONIX) tablet 40 mg  40 mg Oral DAILY    predniSONE (DELTASONE) tablet 10 mg  10 mg Oral QPM    calcium-vitamin D (OS-JANINA) 500 mg-200 unit tablet  1 Tab Oral BID WITH MEALS    cholecalciferol (VITAMIN D3) tablet 1,000 Units  1,000 Units Oral DAILY        Lab Data Reviewed: (see below)  Lab Review:     No results for input(s): WBC, HGB, HCT, PLT, HGBEXT, HCTEXT, PLTEXT, HGBEXT, HCTEXT, PLTEXT in the last 72 hours. Recent Labs      03/03/17   0306 03/02/17 0425 03/01/17   0703   NA   --    --   144   K   --    --   3.7   CL   --    --   112*   CO2   --    --   18*   GLU   --    --   158*   BUN   --    --   28*   CREA   --    --   1.67*   CA   --    --   8.1*   INR  1.4*  1.2*   --      Lab Results   Component Value Date/Time    Glucose (POC) 112 01/24/2010 01:52 PM     No results for input(s): PH, PCO2, PO2, HCO3, FIO2 in the last 72 hours. Recent Labs      03/03/17 0306 03/02/17 0425   INR  1.4*  1.2*     All Micro Results     Procedure Component Value Units Date/Time    CULTURE, BLOOD [[id number]] Collected:  02/25/17 1833    Order Status:  Completed Specimen:  Blood from Blood Updated:  03/03/17 0718     Special Requests: NO SPECIAL REQUESTS        Culture result: NO GROWTH 6 DAYS             I have reviewed notes of prior 24hr. Other pertinent lab:       Total time spent with patient: Vazquez 59 discussed with: Patient, Nursing Staff and >50% of time spent in counseling and coordination of care    Discussed:  Care Plan    Prophylaxis:  Coumadin    Disposition:  SNF/LTC           ___________________________________________________    Attending Physician: Radha Juan MD

## 2017-03-03 NOTE — PROGRESS NOTES
Bedside and Verbal shift change report given to Teresita Faye (oncoming nurse) by Camacho Lewis (offgoing nurse). Report included the following information Kardex, Procedure Summary, Intake/Output, MAR, Recent Results and Med Rec Status.

## 2017-03-03 NOTE — DISCHARGE SUMMARY
Hospitalist Discharge Summary     Patient ID:    Pallavi Colon  [id number]  80 y.o.  12/15/1931    Admit date: 2/25/2017    Discharge date and time: 3/3/2017    Admission Diagnoses: Acute metabolic encephalopathy    Chronic Diagnoses:    Problem List as of 3/3/2017  Date Reviewed: 3/3/2017          Codes Class Noted - Resolved    GI bleed ICD-10-CM: K92.2  ICD-9-CM: 578.9  11/22/2015 - Present        Cirrhosis of liver ([facility] ICD-10-CM: K74.60  ICD-9-CM: 571.5  Unknown - Present        Osteoporosis ICD-10-CM: M81.0  ICD-9-CM: 733.00  Unknown - Present        CKD (chronic kidney disease) stage 3, GFR 30-59 ml/min (Chronic) ICD-10-CM: N18.3  ICD-9-CM: 585.3  2/27/2013 - Present        Hx of deep vein thrombophlebitis of lower extremity ICD-10-CM: Z86.72  ICD-9-CM: V12.52  2/27/2013 - Present        Hx pulmonary embolism ICD-10-CM: A07.672  ICD-9-CM: V12.55  2/27/2013 - Present        Dementia ICD-10-CM: F03.90  ICD-9-CM: 294.20  5/23/2011 - Present        Autoimmune hepatitis ([facility] (Chronic) ICD-10-CM: K75.4  ICD-9-CM: 571.42  2/26/2010 - Present    Overview Addendum 2/26/2010 10:56 AM by Dax Ramirez MD     Goes to [facility]- had a recent even and was anemic- had a DVT 1/24/10- colon 10/09 normal- biopsy has shown the cirrhosis             RESOLVED: Acute metabolic encephalopathy RPP-23-AM: G93.41  ICD-9-CM: 348.31  2/25/2017 - 3/3/2017        RESOLVED: Dehydration ICD-10-CM: E86.0  ICD-9-CM: 276.51  2/25/2017 - 3/3/2017        RESOLVED: Lactic acidosis ICD-10-CM: T97.8  ICD-9-CM: 276.2  2/25/2017 - 3/3/2017        RESOLVED: Leukocytosis, unspecified ICD-10-CM: A70.441  ICD-9-CM: 288.60  4/6/2014 - 11/23/2015        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  ICD-9-CM: V10.52  Unknown - 11/23/2015        RESOLVED: Abdominal pain ICD-10-CM: R10.9  ICD-9-CM: 789.00  4/6/2014 - 11/23/2015        RESOLVED: Fever, unspecified ICD-10-CM: R50.9  ICD-9-CM: 780.60  4/6/2014 - 11/22/2015 RESOLVED: Other pulmonary embolism and infarction ICD-10-CM: I26.99  ICD-9-CM: 415.19  3/5/2013 - 11/23/2015        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  ICD-9-CM: 054.43  2/27/2013 - 11/23/2015        RESOLVED: Saddle embolus of pulmonary artery without acute cor pulmonale (HCC) ICD-10-CM: I26.92  ICD-9-CM: 415.13  2/27/2013 - 2/28/2013        RESOLVED: Left leg DVT ([facility] ICD-10-CM: I82.402  ICD-9-CM: 453.40  2/27/2013 - 2/28/2013        RESOLVED: Pneumonia ICD-10-CM: J18.9  ICD-9-CM: 486  2/21/2013 - 2/27/2013        RESOLVED: Leukocytosis ICD-10-CM: Z37.786  ICD-9-CM: 288.60  2/21/2013 - 2/27/2013        RESOLVED: SIRS (systemic inflammatory response syndrome) ([facility] ICD-10-CM: R65.10  ICD-9-CM: 995.90  2/21/2013 - 3/3/2017        RESOLVED: Renal cell carcinoma (HCC) (Chronic) ICD-10-CM: C64.9  ICD-9-CM: 189.0  2/26/2010 - 4/6/2014    Overview Signed 2/26/2010 10:51 AM by Jaison Black MD     Right kidney removed             RESOLVED: Anemia (Chronic) ICD-10-CM: D64.9  ICD-9-CM: 285.9  2/26/2010 - 2/27/2013    Overview Signed 2/26/2010 10:55 AM by Jaison Black MD     Due to imuran                   Discharge Medications:   Current Discharge Medication List      START taking these medications    Details   amoxicillin-clavulanate (AUGMENTIN) 500-125 mg per tablet Take 1 Tab by mouth every twelve (12) hours. Qty: 10 Tab, Refills: 0         CONTINUE these medications which have NOT CHANGED    Details   cyanocobalamin (VITAMIN B-12) 1,000 mcg tablet Take 1,000 mcg by mouth daily. ferrous gluconate 324 mg (38 mg iron) tablet TAKE 1 TABLET BY MOUTH DAILY WITH BREAKFAST  Qty: 30 Tab, Refills: 2      memantine (NAMENDA) 10 mg tablet Take 1 Tab by mouth two (2) times a day. Indications: Per son Dr. Courtney Nelson stated pt should be taking  Qty: 180 Tab, Refills: 1      donepezil (ARICEPT) 5 mg tablet Take 1 Tab by mouth nightly for 90 days.   Qty: 90 Tab, Refills: 1      esomeprazole (NEXIUM) 20 mg capsule Take 20 mg by mouth daily. predniSONE (DELTASONE) 5 mg tablet Take 10 mg by mouth every evening. Indications: Viral Hepatitis      calcium-vitamin D (OYSTER SHELL) 500 mg(1,250mg) -200 unit per tablet Take 1 Tab by mouth two (2) times daily (with meals). cholecalciferol (VITAMIN D3) 1,000 unit tablet Take 1,000 Units by mouth daily. Denosumab (PROLIA) 60 mg/mL injection 60 mg by SubCUTAneous route every 6 months. Every six months         STOP taking these medications       rivaroxaban (XARELTO) 20 mg tab tablet Comments:   Reason for Stopping: Follow up Care:    1. Lorenza Lino MD in 1-2 weeks  2. Diet:  Cardiac Diet    Disposition:  SNF. Advanced Directive:    Discharge Exam:  See today's note. CONSULTATIONS: None    Significant Diagnostic Studies:   No results for input(s): WBC, HGB, HCT, PLT, HGBEXT, HCTEXT, PLTEXT in the last 72 hours. Recent Labs      03/01/17   0703   NA  144   K  3.7   CL  112*   CO2  18*   BUN  28*   CREA  1.67*   GLU  158*   CA  8.1*     No results for input(s): SGOT, GPT, ALT, AP, TBIL, TBILI, TP, ALB, GLOB, GGT, AML, LPSE in the last 72 hours. No lab exists for component: AMYP, HLPSE  Recent Labs      03/03/17   0306  03/02/17   0425   INR  1.4*  1.2*   PTP  14.5*  11.8*      No results for input(s): FE, TIBC, PSAT, FERR in the last 72 hours. No results for input(s): PH, PCO2, PO2 in the last 72 hours. No results for input(s): CPK, CKMB in the last 72 hours. No lab exists for component: TROPONINI  Lab Results   Component Value Date/Time    Glucose (POC) 112 01/24/2010 01:52 PM             HOSPITAL COURSE & TREATMENT RENDERED:   1. Acute bronchitis/ Acute sinusitis/ SIRS (systemic inflammatory response syndrome) / Leukocytosis / Dehydration / Lactic acidosis: POA. Cough is better. Sinus disease noted on CT. on augmentin for 5 more days       2. Acute metabolic encephalopathy POA: initial CT head unremarkable. Remains stable. Brain MRI showed no acute CVA. Continue PT, OT.      3. Autoimmune hepatitis / Cirrhosis of liver POA: No evidence of ascites, coagulopathy or decomp. Continue prednisone      4. Elevated serum troponin POA. Serial troponin trending down. Echo is unremarkable.       5. Dementia / Debility / Fall. Likely worsening functional decline. Continue Namenda, Aricept. Continue PT, OT.       6. Acute renal failure on CKD (chronic kidney disease) stage 3, GFR 30-59 ml/min. Cr Stable.       7. Iron Deficiency anemia POA: Hgb stable. Continue home iron.       8. Hx pulmonary embolism POA: was on xarelto, but now on coumadin due to CKD.      Pt is discharged in improved condition           Signed:  Latrice Bartlett MD  3/3/2017  12:51 PM

## 2017-03-03 NOTE — PROGRESS NOTES
[facility] Dosing Services: 3/2/17    Consult for Warfarin Dosing by Pharmacy by Dr. Sven Mariano provided for this 80 y.o.  female for indication of PE history. Patient had been on Xarelto prior to admission. Due to poor renal function, therapy changed to warfarin  Day of Therapy 3  Dose to achieve an INR goal of 2-3    Order entered for  Warfarin  5 (mg) ordered to be given today at 18:00. Significant drug interactions: None  Previous dose given 5mg 3/2/17   PT/INR Lab Results   Component Value Date/Time    INR 1.4 03/03/2017 03:06 AM      Platelets Lab Results   Component Value Date/Time    PLATELET 556 99/42/5028 04:02 AM      H/H Lab Results   Component Value Date/Time    HGB 12.7 02/26/2017 04:02 AM      Pharmacy to follow daily and will provide subsequent Warfarin dosing based on clinical status. Thank you,    Sanjana Kirby.  Mal Baltazar

## 2017-03-03 NOTE — DISCHARGE INSTRUCTIONS
ACUTE DIAGNOSES:  Acute metabolic encephalopathy    CHRONIC MEDICAL DIAGNOSES:  Problem List as of 3/3/2017  Date Reviewed: 3/3/2017          Codes Class Noted - Resolved    GI bleed ICD-10-CM: K92.2  ICD-9-CM: 578.9  11/22/2015 - Present        Cirrhosis of liver ([facility] ICD-10-CM: K74.60  ICD-9-CM: 571.5  Unknown - Present        Osteoporosis ICD-10-CM: M81.0  ICD-9-CM: 733.00  Unknown - Present        CKD (chronic kidney disease) stage 3, GFR 30-59 ml/min (Chronic) ICD-10-CM: N18.3  ICD-9-CM: 585.3  2/27/2013 - Present        Hx of deep vein thrombophlebitis of lower extremity ICD-10-CM: Z86.72  ICD-9-CM: V12.52  2/27/2013 - Present        Hx pulmonary embolism ICD-10-CM: Q46.016  ICD-9-CM: V12.55  2/27/2013 - Present        Dementia ICD-10-CM: F03.90  ICD-9-CM: 294.20  5/23/2011 - Present        Autoimmune hepatitis ([facility] (Chronic) ICD-10-CM: K75.4  ICD-9-CM: 571.42  2/26/2010 - Present    Overview Addendum 2/26/2010 10:56 AM by Jasiel Ta MD     Goes to [facility]- had a recent even and was anemic- had a DVT 1/24/10- colon 10/09 normal- biopsy has shown the cirrhosis             RESOLVED: Acute metabolic encephalopathy ON-58-HU: G93.41  ICD-9-CM: 348.31  2/25/2017 - 3/3/2017        RESOLVED: Dehydration ICD-10-CM: E86.0  ICD-9-CM: 276.51  2/25/2017 - 3/3/2017        RESOLVED: Lactic acidosis ICD-10-CM: R68.4  ICD-9-CM: 276.2  2/25/2017 - 3/3/2017        RESOLVED: Leukocytosis, unspecified ICD-10-CM: I86.017  ICD-9-CM: 288.60  4/6/2014 - 11/23/2015        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  ICD-9-CM: V10.52  Unknown - 11/23/2015        RESOLVED: Abdominal pain ICD-10-CM: R10.9  ICD-9-CM: 789.00  4/6/2014 - 11/23/2015        RESOLVED: Fever, unspecified ICD-10-CM: R50.9  ICD-9-CM: 780.60  4/6/2014 - 11/22/2015        RESOLVED: Other pulmonary embolism and infarction ICD-10-CM: I26.99  ICD-9-CM: 415.19  3/5/2013 - 11/23/2015        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  ICD-9-CM: 054.43  2/27/2013 - 11/23/2015        RESOLVED: Saddle embolus of pulmonary artery without acute cor pulmonale (HCC) ICD-10-CM: I26.92  ICD-9-CM: 415.13  2/27/2013 - 2/28/2013        RESOLVED: Left leg DVT ([facility] ICD-10-CM: I82.402  ICD-9-CM: 453.40  2/27/2013 - 2/28/2013        RESOLVED: Pneumonia ICD-10-CM: J18.9  ICD-9-CM: 486  2/21/2013 - 2/27/2013        RESOLVED: Leukocytosis ICD-10-CM: P29.678  ICD-9-CM: 288.60  2/21/2013 - 2/27/2013        RESOLVED: SIRS (systemic inflammatory response syndrome) ([facility] ICD-10-CM: R65.10  ICD-9-CM: 995.90  2/21/2013 - 3/3/2017        RESOLVED: Renal cell carcinoma (HCC) (Chronic) ICD-10-CM: C64.9  ICD-9-CM: 189.0  2/26/2010 - 4/6/2014    Overview Signed 2/26/2010 10:51 AM by Audrey Alexander MD     Right kidney removed             RESOLVED: Anemia (Chronic) ICD-10-CM: D64.9  ICD-9-CM: 285.9  2/26/2010 - 2/27/2013    Overview Signed 2/26/2010 10:55 AM by Audrey Alexander MD     Due to imuran                   DISCHARGE MEDICATIONS:          · It is important that you take the medication exactly as they are prescribed. · Keep your medication in the bottles provided by the pharmacist and keep a list of the medication names, dosages, and times to be taken in your wallet. · Do not take other medications without consulting your doctor. DIET:  Cardiac Diet    ACTIVITY: Activity as tolerated    Keep INR 2-3    ADDITIONAL INFORMATION: If you experience any of the following symptoms then please call your primary care physician or return to the emergency room if you cannot get hold of your doctor: Fever, chills, nausea, vomiting, diarrhea, change in mentation, falling, bleeding, shortness of breath. FOLLOW UP CARE:  Dr. Audrey Alexander MD  you are to call and set up an appointment to see them in 2 weeks. Information obtained by :  I understand that if any problems occur once I am at home I am to contact my physician.     I understand and acknowledge receipt of the instructions indicated above. Physician's or R.N.'s Signature                                                                  Date/Time                                                                                                                                              Patient or Representative Signature                                                          Date/Time    Nutrition Recommendations For Discharge:    Continue Oral Nutrition Supplements at discharge:   Ensure Compact twice daily between meals   for 30 days unless otherwise directed by your Primary Care Physician. This product can be purchased at your local grocery store or drug store and online. Rox Lepe, [address]

## 2017-03-03 NOTE — PROGRESS NOTES
Called [facility] and patient's Armen Hicks has been approved for SNF at the [employer] [id number] x 7 days.  Sent message through [employer] to see if [employer] to see if patient could be accepted today./Cony Cruz Manager of Case Management

## 2017-03-03 NOTE — PROGRESS NOTES
TRANSFER - OUT REPORT:    Verbal report given to  [facility] Richard KULKARNI(name) on Sherri Neumann  being transferred to  [employer](unit) for routine progression of care       Report consisted of patients Situation, Background, Assessment and   Recommendations(SBAR). Information from the following report(s) SBAR, Kardex, Intake/Output, MAR, Accordion, Recent Results and Med Rec Status was reviewed with the receiving nurse. Lines:       Opportunity for questions and clarification was provided.       Patient transport in a wheelchair

## 2017-03-03 NOTE — PROGRESS NOTES
Problem: Mobility Impaired (Adult and Pediatric)  Goal: *Acute Goals and Plan of Care (Insert Text)  Physical Therapy Goals  Initiated 2/27/2017  1. Patient will move from supine to sit and sit to supine , scoot up and down and roll side to side in bed with independence within 7 day(s). 2. Patient will transfer from bed to chair and chair to bed with independence using the least restrictive device within 7 day(s). 3. Patient will perform sit to stand with independence within 7 day(s). 4. Patient will ambulate with independence for 200 feet with the least restrictive device within 7 day(s). PHYSICAL THERAPY TREATMENT  Patient: Abdoul Gonzalez (30 y.o. female)  Date: 3/3/2017  Diagnosis: Acute metabolic encephalopathy <principal problem not specified>       Precautions:        ASSESSMENT:  Pt received sitting on side of bed. Hesitant to participate in PT tx, but daughter in law encouraging patient. Performed transfer training for sit to stand with cues for best placement of hands. Performed transfer three times - poor carryover of instruction. Performed gait training with RW and Min A, occasional assist needed with steering and needs cues to improve safety with backing up to chair to sit. Pt agreeable to sit up in chair. Alarm set and call bell near. Daughter in law also present. Progression toward goals:  [X]    Improving appropriately and progressing toward goals  [ ]    Improving slowly and progressing toward goals  [ ]    Not making progress toward goals and plan of care will be adjusted       PLAN:  Patient continues to benefit from skilled intervention to address the above impairments. Continue treatment per established plan of care. Discharge Recommendations:  Skilled Nursing Facility  Further Equipment Recommendations for Discharge:  tbd       SUBJECTIVE:   Patient stated I don't want to go any further.       OBJECTIVE DATA SUMMARY:   Critical Behavior:  Neurologic State: Eyes open to pain, Eyes open spontaneously, Eyes open to voice, Eyes open to stimulus, Alert  Orientation Level: Oriented to person, Disoriented to situation, Disoriented to time, Oriented to place  Cognition: Appropriate for age attention/concentration, Appropriate safety awareness, Poor safety awareness     Functional Mobility Training:  Bed Mobility:     Supine to Sit: Contact guard assistance;Assist x1;Additional time  Sit to Supine: Minimum assistance;Assist x1;Additional time           Transfers:                                   Balance:     Ambulation/Gait Training:  Distance (ft): 150 Feet (ft)  Assistive Device: Walker, rolling;Gait belt  Ambulation - Level of Assistance: Minimal assistance;Assist x1                                                          Stairs:                          Pain:  Pain Scale 1: Numeric (0 - 10)  Pain Intensity 1: 0              Activity Tolerance:   Limited by patient (no reason given)  Please refer to the flowsheet for vital signs taken during this treatment.   After treatment:   [X]    Patient left in no apparent distress sitting up in chair  [ ]    Patient left in no apparent distress in bed  [X]    Call bell left within reach  [X]    Nursing notified  [X]    Caregiver present  [X]    Chair alarm activated      COMMUNICATION/COLLABORATION:   The patients plan of care was discussed with: Registered Nurse     Vik Long PT   Time Calculation: 14 mins

## 2017-03-03 NOTE — PROGRESS NOTES
[facility] and bed available for the patient for today. Called Dr. Marian Patrick and made him aware of bed availability if patient ready for d/c and he states that patient is medically ready and will be processing the discharge paperwork. I called the son Joanne Sam) who is actually in the room with the patient to make him aware of the acceptance, auth and plan for d/c today. Discussed transport to [facility] and he elects w/c transportation for this and is aware that this is a private pay election.  Called Lana WONG and l/jon with all of this information so he could follow up on this for d/c today./Cony Cruz Manager of Case Management

## 2017-03-03 NOTE — PROGRESS NOTES
3/3/2017 1:23 PM Clinicals and AVS have been sent to [facility] [address] via [facility]. They have been notified of this pt's discharge for today. Spoke with Taryn Montoya, pt's dtr-in-law. Pt's family is aware fo the discharge for today. Joel Koroma[address]     3/3/2017 1:00 PM Wheel chair transport arranged through [employer] for 4:30pm.   [facility] ([phone]) will arrive at 4:30PM. Cost, $60.   Mya Koroma [address]     3/3/2017 12:22 PM Accepted to [facility] and has [place]. Nursing can call report to [phone]. Care management to arrange home health.    GEOFFREY Koroma

## 2017-03-03 NOTE — PROGRESS NOTES
Bedside and Verbal shift change report given to [facility] (oncoming nurse) by Gabrielle Ledezma RN (offgoing nurse). Report included the following information SBAR, Kardex, Intake/Output, MAR and Accordion.

## 2017-03-04 NOTE — PROGRESS NOTES
I have reviewed discharge instructions with the patient and her daughter in law. The patient and daughter in law verbalized understanding.

## 2017-03-06 NOTE — PROGRESS NOTES
[facility] call. Patient discharged on 3/3/17 from [facility]. Admission dates: 2/25/17 - 3/3/17. HOSPITAL COURSE & TREATMENT RENDERED:   1. Acute bronchitis/ Acute sinusitis/ SIRS (systemic inflammatory response syndrome) / Leukocytosis / Dehydration / Lactic acidosis: POA. Cough is better. Sinus disease noted on CT. on augmentin for 5 more days    2. Acute metabolic encephalopathy POA: initial CT head unremarkable. Remains stable. Brain MRI showed no acute CVA. Continue PT, OT.   3. Autoimmune hepatitis / Cirrhosis of liver POA: No evidence of ascites, coagulopathy or decomp. Continue prednisone   4. Elevated serum troponin POA. Serial troponin trending down. Echo is unremarkable.    5. Dementia / Debility / Fall. Likely worsening functional decline. Continue Namenda, Aricept. Continue PT, OT.     6. Acute renal failure on CKD (chronic kidney disease) stage 3, GFR 30-59 ml/min. Cr Stable.    7. Iron Deficiency anemia POA: Hgb stable. Continue home iron.     8. Hx pulmonary embolism POA: was on xarelto, but now on coumadin due to CKD.    Pt is discharged in improved condition       Pt was discharge to SNF, [facility] [address] [place] [address]. Confirmed pt is still admitted, but unable to speak with CN d/t shift change. Call placed to emergency contact Jorge Lo. Per Jorge Lo, he will be meeting with SNF Wednesday to discuss plan of care. Jorge Lo will also be following up with SNF regarding pt having blood check now that pt is on coumadin. Requested that pt f/u with PCP Dr. Abena Caceres within 1 week of d/c, agreed. Contact info provided. This note will not be viewable in [address]

## 2017-03-10 NOTE — TELEPHONE ENCOUNTER
[facility] first thing Monday morning[facility]  Please call [phone]

## 2017-03-14 NOTE — TELEPHONE ENCOUNTER
Contacted [facility] nurse who advises that pt has been readmitted to ICU. Contacted pt son, Elsy Estrada, who is confirming pts admittance to ICU. Pt had decline while in [facility] and was found my grand daughter in her room with labored breathing and was blue. She had 911 called and pt was taken to [facility] and admitted to ICU. She was previously admitted to renal failure but was recovering, apparently had another UTI while in rehab facility and kidney is not recovering. Pt is not doing well, family is discussing palliative care and hospice today.